# Patient Record
Sex: FEMALE | Race: BLACK OR AFRICAN AMERICAN | NOT HISPANIC OR LATINO | Employment: FULL TIME | URBAN - METROPOLITAN AREA
[De-identification: names, ages, dates, MRNs, and addresses within clinical notes are randomized per-mention and may not be internally consistent; named-entity substitution may affect disease eponyms.]

---

## 2019-12-25 ENCOUNTER — OFFICE VISIT (OUTPATIENT)
Dept: URGENT CARE | Facility: URGENT CARE | Age: 54
End: 2019-12-25
Payer: COMMERCIAL

## 2019-12-25 VITALS
TEMPERATURE: 98 F | OXYGEN SATURATION: 97 % | DIASTOLIC BLOOD PRESSURE: 89 MMHG | SYSTOLIC BLOOD PRESSURE: 152 MMHG | WEIGHT: 145 LBS | HEART RATE: 60 BPM

## 2019-12-25 DIAGNOSIS — M25.511 ACUTE PAIN OF RIGHT SHOULDER: Primary | ICD-10-CM

## 2019-12-25 PROCEDURE — 99203 OFFICE O/P NEW LOW 30 MIN: CPT | Performed by: PHYSICIAN ASSISTANT

## 2019-12-25 RX ORDER — ACETAMINOPHEN 500 MG
500-1000 TABLET ORAL EVERY 6 HOURS PRN
Qty: 120 TABLET | Refills: 0 | Status: SHIPPED | OUTPATIENT
Start: 2019-12-25 | End: 2020-01-24

## 2019-12-25 RX ORDER — IBUPROFEN 600 MG/1
600 TABLET, FILM COATED ORAL EVERY 6 HOURS PRN
Qty: 120 TABLET | Refills: 0 | Status: SHIPPED | OUTPATIENT
Start: 2019-12-25 | End: 2020-01-24

## 2019-12-25 NOTE — PATIENT INSTRUCTIONS
Patient Education     Shoulder Pain with Uncertain Cause  Shoulder pain can have many causes. Pain often comes from the structures that surround the shoulder joint. These are the joint capsule, ligaments, tendons, muscles, and bursa. Pain can also come from cartilage in the joint. Cartilage can become worn out or injured. It s important to know what s causing your pain so the healthcare provider can use the correct treatment. But sometimes it s difficult to find the exact cause of shoulder pain. You may need to see a specialist (orthopedist). You may also need special tests such as a CT scan or MRI. The provider may need to use special tools to look inside the joint (arthroscopy).  Shoulder pain can be treated with a sling or a device that keeps your shoulder from moving. You can take an anti-inflammatory medicine such as ibuprofen to ease pain. You may need to do special shoulder exercises. Follow up with a specialist if the pain is severe or doesn t go away after a few weeks.  Home care  Follow these tips when caring for yourself at home:    If a sling was given to you, leave it in place for the time advised by your healthcare provider. If you aren t sure how long to wear it, ask for advice. If the sling becomes loose, adjust it so that your forearm is level with the ground. Your shoulder should feel well supported.    Put an ice pack on the injured area for 20 minutes every 1 to 2 hours the first day. You can make your own ice pack by putting ice cubes in a plastic bag. Wrap the bag in a thin towel. Continue with ice packs 3 to 4 times a day for the next 2 days. Then use the pack as needed to ease pain and swelling.    You may use acetaminophen or ibuprofen to control pain, unless another pain medicine was prescribed. If you have chronic liver or kidney disease, talk with your healthcare provider before using these medicines. Also talk with your provider if you ve ever had a stomach ulcer or GI  bleeding.    Shoulder pain may seem worse at night, when there is less to distract you from the pain. If you sleep on your side, try to keep weight off your painful shoulder. Propping pillows behind you may stop you from rolling over onto that shoulder during sleep.     Shoulder and elbow joints can become stiff if left in a sling for too long. You should start range of motion exercises about 7 to 10 days after the injury. Talk with your provider to find out what type of exercises to do and how soon to start.    You can take the sling off to shower or bathe.  Follow-up care  Follow up with your healthcare provider if you don t start to get better in the next 5 days.  When to seek medical advice  Call your healthcare provider right away if any of these occur:    Pain or swelling gets worse or continues for more than a few days    Your hand or fingers become cold, blue, numb, or tingly    Large amount of bruising on your shoulder or upper arm    Difficulty moving your hand or fingers    Weakness in your hand or fingers    Your shoulder becomes stiff    It feels like your shoulder is popping out    You are less able to do your daily activities  Date Last Reviewed: 10/1/2016    7284-3593 [employer]. [address] [place] [address], [place] [address]. All rights reserved. This information is not intended as a substitute for professional medical care. Always follow your healthcare professional's instructions.

## 2019-12-25 NOTE — PROGRESS NOTES
SUBJECTIVE:  Chief Complaint   Patient presents with     ARM PAIN     Patient has had right shoulder pain for more than a month without any known injury. Patient states that she had an X-ray of the shoulder several weeks ago with [facility] that showed no fracture of the shoulder.     Patient has been [place]esinos her arm in a sling which was given to her during her visit.     Nolan Edwards is a 54 year old female presents with a chief complaint of right shoulder pain.  The injury occurred 1 month(s) ago.    Pain exacerbated by Abduction and flexion of shoulder..  Relieved by rest and ice.      No past medical history on file.  Current Outpatient Medications   Medication Sig Dispense Refill     acetaminophen (TYLENOL) 500 MG tablet Take 1-2 tablets (500-1,000 mg) by mouth every 6 hours as needed for pain 120 tablet 0     ibuprofen (ADVIL/MOTRIN) 600 MG tablet Take 1 tablet (600 mg) by mouth every 6 hours as needed for moderate pain 120 tablet 0     Social History     Tobacco Use     Smoking status: Not on file   Substance Use Topics     Alcohol use: Not on file       ROS:  CONSTITUTIONAL:NEGATIVE for fever, chills, change in weight  INTEGUMENTARY/SKIN: NEGATIVE for worrisome rashes, moles or lesions  RESP:NEGATIVE for significant cough or SOB  CV: NEGATIVE for chest pain, palpitations or peripheral edema  MUSCULOSKELETAL: Patient does not have limits in ROM, however she is slow to abduct or flex the shoulder due to increased pain.   NEURO: NEGATIVE for weakness, dizziness or paresthesias  PSYCHIATRIC: NEGATIVE for changes in mood or affect    EXAM:   BP (!) 152/89   Pulse 60   Temp 98  F (36.7  C)   Wt 65.8 kg (145 lb)   SpO2 97%   Gen: healthy, alert, active and mild distress  Extremity:right shoulder has point tenderness over the superior portion of the deltoid.   There is not compromise to the distal circulation.  Pulses are +2 and CRT is brisk  GENERAL APPEARANCE: healthy, alert and no  distress  EXTREMITIES: peripheral pulses normal  SKIN: no suspicious lesions or rashes  NEURO: Normal strength and tone, sensory exam grossly normal, mentation intact and speech normal.   Neers test on the right was positive. Mack was positive as well.     X-RAY was not done.    ASSESSMENT/PLAN:     (M27.892) Acute pain of right shoulder  (primary encounter diagnosis)  Comment: The etiology of the patient's pain is puzzling. I suspect arthritis or tendonitis.   Plan: ibuprofen (ADVIL/MOTRIN) 600 MG tablet,         acetaminophen (TYLENOL) 500 MG tablet    Rest the affected area as much as possible.  Apply ice for 15-20 minutes intermittently as needed and especially after any offending activity. Hot packs are better for muscle spasms and cramping. Daily stretching as tolerated.  As pain recedes, begin normal activities slowly as tolerated.  Consider Physical Therapy after 6 weeks if symptoms not better with conservative care.      Okay to take acetaminophen 500 mg- 2 tabs (Total of 1000 mg) every 8 hrs   Okay to take ibuprofen 200 mg- 3 tabs (Total of 600 mg) every 6 hours        If pain persists beyond 1 month, patient will need to establish with a PCP and consider MRI of her shoulder to rule out rotator cuff tear.      Patient was advised to return to clinic if symptoms do not improve in the amount of time specified in the AVS or if symptoms worsen. Patient educated on red flag symptoms and asked to go directly to the ED if symptoms present themselves.       Santiago Peña PA-C on 12/25/2019 at 4:34 PM

## 2021-11-11 ENCOUNTER — APPOINTMENT (OUTPATIENT)
Dept: ULTRASOUND IMAGING | Facility: CLINIC | Age: 56
End: 2021-11-11
Attending: EMERGENCY MEDICINE
Payer: COMMERCIAL

## 2021-11-11 ENCOUNTER — OFFICE VISIT (OUTPATIENT)
Dept: URGENT CARE | Facility: URGENT CARE | Age: 56
End: 2021-11-11
Payer: COMMERCIAL

## 2021-11-11 ENCOUNTER — HOSPITAL ENCOUNTER (EMERGENCY)
Facility: CLINIC | Age: 56
Discharge: HOME | End: 2021-11-11
Attending: EMERGENCY MEDICINE | Admitting: EMERGENCY MEDICINE
Payer: COMMERCIAL

## 2021-11-11 ENCOUNTER — APPOINTMENT (OUTPATIENT)
Dept: CT IMAGING | Facility: CLINIC | Age: 56
End: 2021-11-11
Attending: EMERGENCY MEDICINE
Payer: COMMERCIAL

## 2021-11-11 VITALS
HEART RATE: 79 BPM | DIASTOLIC BLOOD PRESSURE: 79 MMHG | OXYGEN SATURATION: 100 % | TEMPERATURE: 97.1 F | WEIGHT: 148.15 LBS | RESPIRATION RATE: 18 BRPM | SYSTOLIC BLOOD PRESSURE: 149 MMHG

## 2021-11-11 VITALS
TEMPERATURE: 97.7 F | DIASTOLIC BLOOD PRESSURE: 88 MMHG | HEART RATE: 74 BPM | OXYGEN SATURATION: 98 % | SYSTOLIC BLOOD PRESSURE: 140 MMHG | RESPIRATION RATE: 12 BRPM | WEIGHT: 151 LBS

## 2021-11-11 DIAGNOSIS — R10.31 RLQ ABDOMINAL PAIN: Primary | ICD-10-CM

## 2021-11-11 DIAGNOSIS — N94.89 ADNEXAL MASS: ICD-10-CM

## 2021-11-11 LAB
ALBUMIN SERPL-MCNC: 3.4 G/DL (ref 3.4–5)
ALBUMIN UR-MCNC: NEGATIVE MG/DL
ALP SERPL-CCNC: 70 U/L (ref 40–150)
ALT SERPL W P-5'-P-CCNC: 25 U/L (ref 0–50)
ANION GAP SERPL CALCULATED.3IONS-SCNC: 4 MMOL/L (ref 3–14)
APPEARANCE UR: CLEAR
AST SERPL W P-5'-P-CCNC: 31 U/L (ref 0–45)
BASOPHILS # BLD AUTO: 0 10E3/UL (ref 0–0.2)
BASOPHILS NFR BLD AUTO: 0 %
BILIRUB SERPL-MCNC: 0.3 MG/DL (ref 0.2–1.3)
BILIRUB UR QL STRIP: NEGATIVE
BUN SERPL-MCNC: 19 MG/DL (ref 7–30)
CALCIUM SERPL-MCNC: 8.8 MG/DL (ref 8.5–10.1)
CHLORIDE BLD-SCNC: 109 MMOL/L (ref 94–109)
CO2 SERPL-SCNC: 27 MMOL/L (ref 20–32)
COLOR UR AUTO: ABNORMAL
CREAT SERPL-MCNC: 0.55 MG/DL (ref 0.52–1.04)
EOSINOPHIL # BLD AUTO: 0.2 10E3/UL (ref 0–0.7)
EOSINOPHIL NFR BLD AUTO: 2 %
ERYTHROCYTE [DISTWIDTH] IN BLOOD BY AUTOMATED COUNT: 12.4 % (ref 10–15)
GFR SERPL CREATININE-BSD FRML MDRD: >90 ML/MIN/1.73M2
GLUCOSE BLD-MCNC: 102 MG/DL (ref 70–99)
GLUCOSE UR STRIP-MCNC: NEGATIVE MG/DL
HCT VFR BLD AUTO: 41.8 % (ref 35–47)
HGB BLD-MCNC: 13.7 G/DL (ref 11.7–15.7)
HGB UR QL STRIP: NEGATIVE
HOLD SPECIMEN: NORMAL
IMM GRANULOCYTES # BLD: 0 10E3/UL
IMM GRANULOCYTES NFR BLD: 0 %
KETONES UR STRIP-MCNC: NEGATIVE MG/DL
LEUKOCYTE ESTERASE UR QL STRIP: ABNORMAL
LIPASE SERPL-CCNC: 138 U/L (ref 73–393)
LYMPHOCYTES # BLD AUTO: 3.8 10E3/UL (ref 0.8–5.3)
LYMPHOCYTES NFR BLD AUTO: 48 %
MCH RBC QN AUTO: 30.2 PG (ref 26.5–33)
MCHC RBC AUTO-ENTMCNC: 32.8 G/DL (ref 31.5–36.5)
MCV RBC AUTO: 92 FL (ref 78–100)
MONOCYTES # BLD AUTO: 0.5 10E3/UL (ref 0–1.3)
MONOCYTES NFR BLD AUTO: 7 %
NEUTROPHILS # BLD AUTO: 3.4 10E3/UL (ref 1.6–8.3)
NEUTROPHILS NFR BLD AUTO: 43 %
NITRATE UR QL: NEGATIVE
NRBC # BLD AUTO: 0 10E3/UL
NRBC BLD AUTO-RTO: 0 /100
PH UR STRIP: 7 [PH] (ref 5–7)
PLATELET # BLD AUTO: 320 10E3/UL (ref 150–450)
POTASSIUM BLD-SCNC: 4.1 MMOL/L (ref 3.4–5.3)
PROT SERPL-MCNC: 7.6 G/DL (ref 6.8–8.8)
RBC # BLD AUTO: 4.53 10E6/UL (ref 3.8–5.2)
RBC URINE: <1 /HPF
SODIUM SERPL-SCNC: 140 MMOL/L (ref 133–144)
SP GR UR STRIP: 1.02 (ref 1–1.03)
UROBILINOGEN UR STRIP-MCNC: NORMAL MG/DL
WBC # BLD AUTO: 7.9 10E3/UL (ref 4–11)
WBC URINE: 3 /HPF

## 2021-11-11 PROCEDURE — 85025 COMPLETE CBC W/AUTO DIFF WBC: CPT | Performed by: EMERGENCY MEDICINE

## 2021-11-11 PROCEDURE — 250N000009 HC RX 250: Performed by: EMERGENCY MEDICINE

## 2021-11-11 PROCEDURE — 36415 COLL VENOUS BLD VENIPUNCTURE: CPT | Performed by: EMERGENCY MEDICINE

## 2021-11-11 PROCEDURE — 82378 CARCINOEMBRYONIC ANTIGEN: CPT | Performed by: EMERGENCY MEDICINE

## 2021-11-11 PROCEDURE — 96374 THER/PROPH/DIAG INJ IV PUSH: CPT | Mod: 59

## 2021-11-11 PROCEDURE — 250N000011 HC RX IP 250 OP 636: Performed by: EMERGENCY MEDICINE

## 2021-11-11 PROCEDURE — 86304 IMMUNOASSAY TUMOR CA 125: CPT | Performed by: EMERGENCY MEDICINE

## 2021-11-11 PROCEDURE — 76830 TRANSVAGINAL US NON-OB: CPT

## 2021-11-11 PROCEDURE — 99285 EMERGENCY DEPT VISIT HI MDM: CPT | Mod: 25

## 2021-11-11 PROCEDURE — 81001 URINALYSIS AUTO W/SCOPE: CPT | Performed by: EMERGENCY MEDICINE

## 2021-11-11 PROCEDURE — 74177 CT ABD & PELVIS W/CONTRAST: CPT

## 2021-11-11 PROCEDURE — 93976 VASCULAR STUDY: CPT | Mod: XS

## 2021-11-11 PROCEDURE — 83690 ASSAY OF LIPASE: CPT | Performed by: EMERGENCY MEDICINE

## 2021-11-11 PROCEDURE — 82040 ASSAY OF SERUM ALBUMIN: CPT | Performed by: EMERGENCY MEDICINE

## 2021-11-11 PROCEDURE — 99215 OFFICE O/P EST HI 40 MIN: CPT | Performed by: PHYSICIAN ASSISTANT

## 2021-11-11 RX ORDER — LEVOTHYROXINE SODIUM 75 UG/1
75 TABLET ORAL DAILY
COMMUNITY
Start: 2021-09-11

## 2021-11-11 RX ORDER — IOPAMIDOL 755 MG/ML
500 INJECTION, SOLUTION INTRAVASCULAR ONCE
Status: COMPLETED | OUTPATIENT
Start: 2021-11-11 | End: 2021-11-11

## 2021-11-11 RX ORDER — LORAZEPAM 0.5 MG
2000 TABLET ORAL
COMMUNITY
Start: 2019-08-23

## 2021-11-11 RX ORDER — KETOROLAC TROMETHAMINE 15 MG/ML
15 INJECTION, SOLUTION INTRAMUSCULAR; INTRAVENOUS ONCE
Status: COMPLETED | OUTPATIENT
Start: 2021-11-11 | End: 2021-11-11

## 2021-11-11 RX ADMIN — KETOROLAC TROMETHAMINE 15 MG: 15 INJECTION, SOLUTION INTRAMUSCULAR; INTRAVENOUS at 20:53

## 2021-11-11 RX ADMIN — IOPAMIDOL 74 ML: 755 INJECTION, SOLUTION INTRAVENOUS at 19:39

## 2021-11-11 RX ADMIN — SODIUM CHLORIDE 59 ML: 9 INJECTION, SOLUTION INTRAVENOUS at 19:39

## 2021-11-11 NOTE — PROGRESS NOTES
Assessment/Plan:    Pt has RLQ pain including tenderness at McBurney's point, as well as anorexia. She has no RBT or rigidity, is afebrile. I am concerned about appendicitis and have advised pt go to the ER at [facility] to have this ruled out. She will go by private vehicle.   See patient instructions below.    At the end of the encounter, I discussed results, diagnosis, medications. Discussed red flags for immediate return to clinic/ER, as well as indications for follow up if no improvement. Patient understood and agreed to plan. Patient was stable for discharge.      ICD-10-CM    1. RLQ abdominal pain  R10.31          Return in about 1 week (around 2021) for Follow up w/ primary care provider after ER visit.    SORAYA Hayden, ROBERTO  [facility]  -----------------------------------------------------------------------------------------------------------------------------------------------------    HPI:  Nolan Edwards is a 56 year old female who presents for evaluation of RLQ abdominal pain & decreased appetite onset 0300 this AM. Pain woke her from her sleep and has been constant since then. Pain radiates into R lower back. Ibuprofen helps somewhat. Patient reports no fever/chills, headache, chest pain, shortness of breath, nausea, vomiting, constipation, diarrhea, urinary symptoms, rash, or any other symptoms.     She has hx of two  sections and fibroid removal, no other abdominal surgeries.    No past medical history on file.    Vitals:    21 1713   BP: (!) 140/88   Pulse: 74   Resp: 12   Temp: 97.7  F (36.5  C)   TempSrc: Tympanic   SpO2: 98%   Weight: 68.5 kg (151 lb)       Physical Exam  Vitals and nursing note reviewed.   Pulmonary:      Effort: Pulmonary effort is normal.   Abdominal:      General: Abdomen is flat. Bowel sounds are normal.      Palpations: Abdomen is soft.      Tenderness: There is abdominal tenderness in the right  lower quadrant. There is no right CVA tenderness or left CVA tenderness. Positive signs include McBurney's sign.   Musculoskeletal:      Lumbar back: Tenderness present. No bony tenderness.        Back:    Neurological:      Mental Status: She is alert.         Labs/Imaging:  No results found for this or any previous visit (from the past 24 hour(s)).      Patient Instructions   Go to the ER at [facility] for further evaluation- to rule out appendicitis.

## 2021-11-11 NOTE — NURSING NOTE
Chief Complaint   Patient presents with     Urgent Care     Abdominal Pain     She began having right sided mid  abdominal pain since about 3:00 a.m. today.  No fever today. No appetite today.  She has not vomited.  Shehad a normal BM this morning.  She say the pain radiates around to her right side and into back.  She says she isnt sure but maybe radiating pain into her right leg at times as well.     Initial BP (!) 140/88   Pulse 74   Temp 97.7  F (36.5  C) (Tympanic)   Resp 12   Wt 68.5 kg (151 lb)   SpO2 98%  There is no height or weight on file to calculate BMI..  BP completed using cuff size: regular  Joseline Person R.N.

## 2021-11-12 LAB
CANCER AG125 SERPL-ACNC: <5 U/ML (ref 0–30)
CEA SERPL-MCNC: 0.8 UG/L (ref 0–2.5)

## 2021-11-12 ASSESSMENT — ENCOUNTER SYMPTOMS
COUGH: 0
NAUSEA: 0
ABDOMINAL PAIN: 1
DYSURIA: 0
VOMITING: 0
FLANK PAIN: 0
CHILLS: 0
FEVER: 0
SHORTNESS OF BREATH: 0

## 2021-11-12 NOTE — PROGRESS NOTES
RECORDS STATUS - ALL OTHER DIAGNOSIS      RECORDS RECEIVED FROM: [facility]   DATE RECEIVED: 11/16/2021   NOTES STATUS DETAILS   OFFICE NOTE from referring provider     OFFICE NOTE from medical oncologist N/A    DISCHARGE SUMMARY from hospital N/A    DISCHARGE REPORT from the ER Complete [facility] 11/11/2021 Adnexal Mass    OPERATIVE REPORT     MEDICATION LIST [facility]   CLINICAL TRIAL TREATMENTS TO DATE     LABS     PATHOLOGY REPORTS     ANYTHING RELATED TO DIAGNOSIS Complete Labs Last updated 11/11/2021   GENONOMIC TESTING     TYPE:     IMAGING (NEED IMAGES & REPORT)     CT SCANS Complete CT Abdomen Pelvis 11/11/2021   MRI     MAMMO     ULTRASOUND Complete US Pelvis Complete 11/11/2021   PET

## 2021-11-12 NOTE — ED TRIAGE NOTES
Pt comes in for rt side abd pain that started at 3 am. Pt sent over from urgent care to rule out appendicitis. No testing done prior to arrival. Pt tried Ibuprofen with little relief. Pt is nauseated, no vomiting.

## 2021-11-12 NOTE — DISCHARGE INSTRUCTIONS
Dr. Whitfield's office should be calling tomorrow to schedule follow-up appointment. If you don't hear from her office, ok to call tomorrow afternoon to try to schedule.

## 2021-11-12 NOTE — ED PROVIDER NOTES
History     Chief Complaint:  Abdominal Pain    HPI   Nolan Edwards is a 56 year old female who presents with chief complaint abdominal pain.  Patient reports pain is localized to the right side of her lower abdomen with onset about 16 hours ago.  She denies similar symptoms in the past.  She reports pain is lower in her abdomen and feels like it is next to her bladder.  She denies nausea or vomiting.  Denies fevers or chills.  She tried ibuprofen with minimal relief.  She presented to urgent care first and was subsequently sent to emergency department for further evaluation and treatment.  She reports she has had prior abdominal surgery for fibroid removal back in 2009.  She reports that her last menses was around then.  She denies any vaginal bleeding.    Review of Systems   Constitutional: Negative for chills and fever.   Respiratory: Negative for cough and shortness of breath.    Cardiovascular: Negative for chest pain.   Gastrointestinal: Positive for abdominal pain. Negative for nausea and vomiting.   Genitourinary: Positive for pelvic pain. Negative for dysuria, flank pain, menstrual problem and vaginal bleeding.   All other systems reviewed and are negative.        Allergies:  Ceftriaxone  Cephalexin  Ukbvihus-Szbwmpm-Gtmwmw [Fluocinolone]  Quinolones  Salicylates      Medications:    Levothyroxine     Past Medical History:    Uterine fibroids   Hypothyroidism    Past Surgical History:    Uterine fibroid removal  Shoulder surgery  Tubal ligation    Family History:    No known history of ovarian or breast cancer.    Social History:  Here with her     Physical Exam     Patient Vitals for the past 24 hrs:   BP Temp Temp src Pulse Resp SpO2 Weight   11/11/21 1915 (!) 150/82 -- -- -- -- 97 % --   11/11/21 1901 (!) 159/97 -- -- 74 -- -- --   11/11/21 1833 (!) 152/103 97.1  F (36.2  C) Temporal 84 18 -- 67.2 kg (148 lb 2.4 oz)       Physical Exam  Nursing note and vitals reviewed.    HENT:    Mouth/Throat: Moist mucous membranes.   Eyes: EOMI, nonicteric sclera  Cardiovascular: Normal rate, regular rhythm, no murmurs, rubs, or gallops  Pulmonary/Chest: Effort normal and breath sounds normal. No respiratory distress. No wheezes. No rales.   Abdominal: Soft.  Mild right lower quadrant tenderness to palpation without guarding or rigidity, nondistended  Musculoskeletal: Normal range of motion.   Neurological: Alert. Moves all extremities spontaneously.   Skin: Skin is warm and dry. No rash noted.   Psychiatric: Normal mood and affect.       Emergency Department Course       Imaging:  US Pelvis Cmplt w Transvag & Doppler LmtPel Duplex Limited   Final Result   IMPRESSION:     1.  Left adnexal mass as noted on CT with differential including both malignant and benign etiologies. Gynecologic surgery consult recommended to consider oophorectomy.      NOTE: ABNORMAL REPORT      THE DICTATION ABOVE DESCRIBES AN ABNORMALITY FOR WHICH FOLLOW-UP IS NEEDED.          CYSTS WITH WORRISOME CHARACTERISTICS:   (thick septations >3mm, solid elements, wall thickening >=3 mm, or nodules)   Any age: surgical evaluation recommended      REFERENCE:   Management of Asymptomatic Ovarian and Other Adnexal Cysts Imaged at US: Society of Radiologists in Ultrasound Consensus Conference Statement. Radiology September 2010; [phone].      Simple Adnexal Cysts: SRU Consensus Conference Update on Follow-up and Reporting. Radiology September 2019. [phone].      Abd/pelvis CT,  IV  contrast only TRAUMA / AAA   Final Result   IMPRESSION:    1.  7.5 x 7.2 cm left adnexal mass indeterminate for malignancy. Gynecologic surgery consult recommended to consider oophorectomy.   2.  Diverticulosis without diverticulitis.      NOTE: ABNORMAL REPORT      THE DICTATION ABOVE DESCRIBES AN ABNORMALITY FOR WHICH FOLLOW-UP IS NEEDED.           Laboratory:  Labs Ordered and Resulted from Time of ED Arrival to Time of ED Departure   COMPREHENSIVE  METABOLIC PANEL - Abnormal       Result Value    Sodium 140      Potassium 4.1      Chloride 109      Carbon Dioxide (CO2) 27      Anion Gap 4      Urea Nitrogen 19      Creatinine 0.55      Calcium 8.8      Glucose 102 (*)     Alkaline Phosphatase 70      AST 31      ALT 25      Protein Total 7.6      Albumin 3.4      Bilirubin Total 0.3      GFR Estimate >90     ROUTINE UA WITH MICROSCOPIC REFLEX TO CULTURE - Abnormal    Color Urine Straw      Appearance Urine Clear      Glucose Urine Negative      Bilirubin Urine Negative      Ketones Urine Negative      Specific Gravity Urine 1.024      Blood Urine Negative      pH Urine 7.0      Protein Albumin Urine Negative      Urobilinogen Urine Normal      Nitrite Urine Negative      Leukocyte Esterase Urine Trace (*)     RBC Urine <1      WBC Urine 3     LIPASE - Normal    Lipase 138     CBC WITH PLATELETS AND DIFFERENTIAL    WBC Count 7.9      RBC Count 4.53      Hemoglobin 13.7      Hematocrit 41.8      MCV 92      MCH 30.2      MCHC 32.8      RDW 12.4      Platelet Count 320      % Neutrophils 43      % Lymphocytes 48      % Monocytes 7      % Eosinophils 2      % Basophils 0      % Immature Granulocytes 0      NRBCs per 100 WBC 0      Absolute Neutrophils 3.4      Absolute Lymphocytes 3.8      Absolute Monocytes 0.5      Absolute Eosinophils 0.2      Absolute Basophils 0.0      Absolute Immature Granulocytes 0.0      Absolute NRBCs 0.0             Emergency Department Course:    Reviewed:  I reviewed nursing notes, vitals, past history and care everywhere    Consults:   Gyn/onc    Interventions:  Medications   iopamidol (ISOVUE-370) solution 500 mL (74 mLs Intravenous Given 11/11/21 1939)   sodium chloride 0.9 % bag 100mL for CT scan flush use (59 mLs Intravenous Given 11/11/21 1939)   ketorolac (TORADOL) injection 15 mg (15 mg Intravenous Given 11/11/21 2053)       Disposition:  The patient was discharged to home.    Impression & Plan         Medical Decision  Making:  Patient presents with right lower quadrant abdominal pain.  Broad differential diagnosis considered.  Given the concern for potentially emergent intra-abdominal process, CT of the abdomen and pelvis was obtained.  This did not suggest appendicitis, but a left-sided ovarian mass was visualized.  I reviewed this on imaging and mass is pretty close to midline and does cross midline therefore in the absence of other findings, I do believe that this represents the nature of patient's pain.  Given the concern for malignancy as well as possible torsion given the size, a pelvic ultrasound was also obtained which redemonstrates the mass, but blood flow was noted.  Certainly, this does not rule out torsion, but patient likely needs oophorectomy regardless.  Discussed with on-call gynecologic surgery who agree with discharge home and close outpatient follow-up with Dr. Whitfield.  They will facilitate scheduling follow-up for patient.  Patient and  were fully informed as to possibility of both benign and malignant causes.  Gynecologic surgery did ask me to add on tumor markers that they will discuss the results with patient in clinic.  Patient and  are in agreement with plan.  Patient safe for discharge home.  All questions answered.  Red flags to merit ED return discussed.    Diagnosis:    ICD-10-CM    1. Adnexal mass  N94.89          Scribe Disclosure:  I, Maggie Marino, am serving as a scribe at 7:21 PM on 11/11/2021 to document services personally performed by Brodie Mcallister MD based on my observations and the provider's statements to me.      Brodie Mcallister MD  11/12/21 1527

## 2021-11-16 ENCOUNTER — LAB (OUTPATIENT)
Dept: ONCOLOGY | Facility: CLINIC | Age: 56
End: 2021-11-16
Attending: OBSTETRICS & GYNECOLOGY
Payer: COMMERCIAL

## 2021-11-16 ENCOUNTER — PRE VISIT (OUTPATIENT)
Dept: ONCOLOGY | Facility: CLINIC | Age: 56
End: 2021-11-16

## 2021-11-16 VITALS
HEART RATE: 85 BPM | WEIGHT: 149 LBS | SYSTOLIC BLOOD PRESSURE: 163 MMHG | DIASTOLIC BLOOD PRESSURE: 88 MMHG | HEIGHT: 63 IN | OXYGEN SATURATION: 98 % | RESPIRATION RATE: 16 BRPM | BODY MASS INDEX: 26.4 KG/M2 | TEMPERATURE: 97.7 F

## 2021-11-16 DIAGNOSIS — R19.00 PELVIC MASS: Primary | ICD-10-CM

## 2021-11-16 DIAGNOSIS — R19.00 PELVIC MASS: ICD-10-CM

## 2021-11-16 LAB
ABO/RH(D): NORMAL
ALBUMIN SERPL-MCNC: 3.7 G/DL (ref 3.4–5)
ALP SERPL-CCNC: 72 U/L (ref 40–150)
ALT SERPL W P-5'-P-CCNC: 23 U/L (ref 0–50)
ANION GAP SERPL CALCULATED.3IONS-SCNC: 4 MMOL/L (ref 3–14)
ANTIBODY SCREEN: NEGATIVE
AST SERPL W P-5'-P-CCNC: 22 U/L (ref 0–45)
BASOPHILS # BLD AUTO: 0 10E3/UL (ref 0–0.2)
BASOPHILS NFR BLD AUTO: 0 %
BILIRUB SERPL-MCNC: 0.2 MG/DL (ref 0.2–1.3)
BUN SERPL-MCNC: 14 MG/DL (ref 7–30)
CALCIUM SERPL-MCNC: 9.2 MG/DL (ref 8.5–10.1)
CHLORIDE BLD-SCNC: 107 MMOL/L (ref 94–109)
CO2 SERPL-SCNC: 28 MMOL/L (ref 20–32)
CREAT SERPL-MCNC: 0.58 MG/DL (ref 0.52–1.04)
EOSINOPHIL # BLD AUTO: 0.1 10E3/UL (ref 0–0.7)
EOSINOPHIL NFR BLD AUTO: 1 %
ERYTHROCYTE [DISTWIDTH] IN BLOOD BY AUTOMATED COUNT: 12.2 % (ref 10–15)
GFR SERPL CREATININE-BSD FRML MDRD: >90 ML/MIN/1.73M2
GLUCOSE BLD-MCNC: 95 MG/DL (ref 70–99)
HCT VFR BLD AUTO: 41.7 % (ref 35–47)
HGB BLD-MCNC: 13.6 G/DL (ref 11.7–15.7)
IMM GRANULOCYTES # BLD: 0 10E3/UL
IMM GRANULOCYTES NFR BLD: 0 %
LYMPHOCYTES # BLD AUTO: 2.5 10E3/UL (ref 0.8–5.3)
LYMPHOCYTES NFR BLD AUTO: 36 %
MCH RBC QN AUTO: 30 PG (ref 26.5–33)
MCHC RBC AUTO-ENTMCNC: 32.6 G/DL (ref 31.5–36.5)
MCV RBC AUTO: 92 FL (ref 78–100)
MONOCYTES # BLD AUTO: 0.5 10E3/UL (ref 0–1.3)
MONOCYTES NFR BLD AUTO: 7 %
NEUTROPHILS # BLD AUTO: 3.8 10E3/UL (ref 1.6–8.3)
NEUTROPHILS NFR BLD AUTO: 56 %
NRBC # BLD AUTO: 0 10E3/UL
NRBC BLD AUTO-RTO: 0 /100
PLATELET # BLD AUTO: 324 10E3/UL (ref 150–450)
POTASSIUM BLD-SCNC: 4 MMOL/L (ref 3.4–5.3)
PROT SERPL-MCNC: 8.1 G/DL (ref 6.8–8.8)
RBC # BLD AUTO: 4.53 10E6/UL (ref 3.8–5.2)
SODIUM SERPL-SCNC: 139 MMOL/L (ref 133–144)
SPECIMEN EXPIRATION DATE: NORMAL
WBC # BLD AUTO: 6.8 10E3/UL (ref 4–11)

## 2021-11-16 PROCEDURE — 85025 COMPLETE CBC W/AUTO DIFF WBC: CPT | Performed by: OBSTETRICS & GYNECOLOGY

## 2021-11-16 PROCEDURE — 86900 BLOOD TYPING SEROLOGIC ABO: CPT | Performed by: OBSTETRICS & GYNECOLOGY

## 2021-11-16 PROCEDURE — 99205 OFFICE O/P NEW HI 60 MIN: CPT | Performed by: OBSTETRICS & GYNECOLOGY

## 2021-11-16 PROCEDURE — 36415 COLL VENOUS BLD VENIPUNCTURE: CPT

## 2021-11-16 PROCEDURE — G0463 HOSPITAL OUTPT CLINIC VISIT: HCPCS

## 2021-11-16 PROCEDURE — 82040 ASSAY OF SERUM ALBUMIN: CPT | Performed by: OBSTETRICS & GYNECOLOGY

## 2021-11-16 RX ORDER — OMEGA-3 FATTY ACIDS/FISH OIL 300-1000MG
200 CAPSULE ORAL EVERY 4 HOURS PRN
COMMUNITY

## 2021-11-16 RX ORDER — HEPARIN SODIUM 10000 [USP'U]/ML
5000 INJECTION, SOLUTION INTRAVENOUS; SUBCUTANEOUS
Status: CANCELLED | OUTPATIENT
Start: 2021-11-16

## 2021-11-16 RX ORDER — PHENAZOPYRIDINE HYDROCHLORIDE 100 MG/1
200 TABLET, FILM COATED ORAL ONCE
Status: CANCELLED | OUTPATIENT
Start: 2021-11-16 | End: 2021-11-16

## 2021-11-16 ASSESSMENT — PAIN SCALES - GENERAL: PAINLEVEL: NO PAIN (0)

## 2021-11-16 ASSESSMENT — MIFFLIN-ST. JEOR: SCORE: 1226.73

## 2021-11-16 NOTE — PROGRESS NOTES
Medical Assistant Note:  Nolan Jerry presents today for blood draw.    Patient seen by provider today: Yes: Dr. Whitfield.   present during visit today: Not Applicable.    Concerns: No Concerns.    Procedure:  Labs drawn    Post Assessment:  Labs drawn without difficulty: Yes.    Discharge Plan:  Departure Mode: Ambulatory.    Face to Face Time: 10 min.    Bela Donahue [facility]

## 2021-11-16 NOTE — PROGRESS NOTES
Consult Notes on Referred Patient            RE: Nolan Edwards  : 1965  VICTORIA: 2021        I had the pleasure of seeing your patient Nolan Edwards here at the Gynecologic Cancer Clinic at the [facility] on 2021.  As you know she is a very pleasant 56 year old woman with a recent diagnosis of pelvic mass.  Given these findings she was subsequently sent to the Gynecologic Cancer Clinic for new patient consultation.  She is accompanied today by her son.    Patient had been having intermittent  right lower abdominal pain, however it worsened and she thus presented to the ED with an acute worsening of right lower quadrant pain and was found to have an ovarian mass. She denies changes in her bowel/bladder habits.  No PMB    3/18/08:  1.  Abdominal myomectomy with removal of thirteen fibroids. 2.  Bilateral tubal ligation via modified [place] with JEAN MARIE GUERRERO MD     Pathology:  Benign leiomyoma    21:   <5, CEA 0.8    21:  CT A/P (Personally reviewed):  1.  7.5 x 7.2 cm left adnexal mass indeterminate for malignancy. 2.  Diverticulosis without diverticulitis.    21:  US Pelvis (personally reviewed):  UTERUS: 6.9 x 1.9 x 4.7 cm. Normal in size and position with no masses. ENDOMETRIUM: 3 mm. Not well-defined. No mass identified. RIGHT OVARY: Not visualized due to bowel gas. LEFT OVARY: 9.4 x 4.3 x 7.4 cm. The entire left adnexa is replaced with a multi-locular cystic mass with duplex Doppler flow noted within the septations.No significant free fluid.    Obstetrics and Gynecology History:  , c/s x 2      Past Medical History:  Past Medical History:   Diagnosis Date     Hypothyroidism        Past Surgical History:  Past Surgical History:   Procedure Laterality Date      SECTION       MYOMECTOMY       ROTATOR CUFF REPAIR RT/LT Right        Health Maintenance:  Last Pap Smear: 20              Result: Negative, HPV negative  She has not had a  "history of abnormal Pap smears.    Last Mammogram: 8/17/21              Result: normal      She has not had a history of abnormal mammograms.    Last Colonoscopy: 7/16/18              Result: Polyp, repeat 5 years       Social History:  Lives with her  and son, feels safe at home.  Works as a  in [employer] public schools.  Enjoys spending time outside.  Does not have an advanced directive on file and would like her  and children to be her POA.  Would like full resuscitation if reversible cause is identified, however would not like to be kept on life sustaining measures long-term.     Family History:   The patient's family history is significant for.  Family History   Problem Relation Age of Onset     Cancer No family hx of          Physical Exam:   BP (!) 163/88   Pulse 85   Temp 97.7  F (36.5  C) (Tympanic)   Resp 16   Ht 1.595 m (5' 2.8\")   Wt 67.6 kg (149 lb)   SpO2 98%   BMI 26.57 kg/m    Body mass index is 26.57 kg/m .    General Appearance: healthy and alert, no distress        Cardiovascular: regular rate and rhythm    Respiratory: lungs clear     Gastrointestinal:       abdomen soft, non-tender, non-distended, no organomegaly or masses    Genitourinary: External genitalia and urethral meatus appears normal.  Vagina is smooth without nodularity or masses.  Cervix appears normal and without lesions.  Bimanual exam reveals a mobile mass in the posterior cul-de-sac.  Recto-vaginal exam confirms these findings.    Labs:  WBC 6.8 with ANC 3.8.  Hemoglobin 13.6.  Platelets 324.  Creatinine 0.58.  Potassium 4.  Magnesium -.  Remainder of electrolytes within normal limits.  AST 22, ALT 23, alkaline phosphatase 72, total bilirubin 0.2.  Albumin 3.7.       Assessment:    Nolan Edwards is a 56 year old woman with a new diagnosis of pelvic mass.     A total of 60 minutes was spent on patient care today.      Plan:     1.)    Pelvic mass-.We reviewed the possible etiologies " including benign, malignant and borderline.  We reviewed that given her normal  and CT without evidence of metastatic disease, that a benign etiology is more likely.  We reviewed the rationale for not performing a biopsy of the ovary.  We discussed options for repeat imaging vs immediate surgical resection and given that she is symptomatic she prefers immediate surgical intervention.  We discussed the role of frozen section analysis and that further surgical procedures including staging procedures would be based on these findings.  We discussed the risks and benefits of bilateral vs unilateral oophorectomy in the setting of a benign etiology and she would like to consider this further prior to making a final decision and we will readdress the morning of surgery.  We did discuss the rationale for bilateral salpingectomy and she is in agreement with this.  Risks, benefits, indications and alternatives were discussed.  All her questions were answered and she will undergo laparoscopic removal of one ovary and both fallopian tubes, possible cancer surgery, possible open on 12/9.     2.) Genetic risk factors were assessed and the patient does not meet the qualifications for a referral unless malignancy is identified.      3.) Labs and/or tests ordered include:  CBC, CMP, T/S, COVID.     4.) Health maintenance issues addressed today include pt is up to date.    5.) Pre-op teaching was completed today.        Thank you for allowing us to participate in the care of your patient.         Sincerely,    Pau Whitfield MD  Gynecologic Oncology  [facility]       CC

## 2021-11-16 NOTE — LETTER
2021         RE: Nolan Edwards  [address]  [place] [address]        Dear Colleague,    Thank you for referring your patient, Nolan Edwards, to the [facility]. Please see a copy of my visit note below.    Consult Notes on Referred Patient            RE: Nolan Edwards  : 1965  VICTORIA: 2021        I had the pleasure of seeing your patient Nolan Edwards here at the Gynecologic Cancer Clinic at the [facility] on 2021.  As you know she is a very pleasant 56 year old woman with a recent diagnosis of pelvic mass.  Given these findings she was subsequently sent to the Gynecologic Cancer Clinic for new patient consultation.  She is accompanied today by her son.    Patient had been having intermittent  right lower abdominal pain, however it worsened and she thus presented to the ED with an acute worsening of right lower quadrant pain and was found to have an ovarian mass. She denies changes in her bowel/bladder habits.  No PMB    3/18/08:  1.  Abdominal myomectomy with removal of thirteen fibroids. 2.  Bilateral tubal ligation via modified [place] with PA TATI GUERRERO MD     Pathology:  Benign leiomyoma    21:   <5, CEA 0.8    21:  CT A/P (Personally reviewed):  1.  7.5 x 7.2 cm left adnexal mass indeterminate for malignancy. 2.  Diverticulosis without diverticulitis.    21:  US Pelvis (personally reviewed):  UTERUS: 6.9 x 1.9 x 4.7 cm. Normal in size and position with no masses. ENDOMETRIUM: 3 mm. Not well-defined. No mass identified. RIGHT OVARY: Not visualized due to bowel gas. LEFT OVARY: 9.4 x 4.3 x 7.4 cm. The entire left adnexa is replaced with a multi-locular cystic mass with duplex Doppler flow noted within the septations.No significant free fluid.    Obstetrics and Gynecology History:  , c/s x 2      Past Medical History:  Past Medical History:   Diagnosis Date     Hypothyroidism        Past Surgical  "History:  Past Surgical History:   Procedure Laterality Date      SECTION       MYOMECTOMY       ROTATOR CUFF REPAIR RT/LT Right        Health Maintenance:  Last Pap Smear: 20              Result: Negative, HPV negative  She has not had a history of abnormal Pap smears.    Last Mammogram: 21              Result: normal      She has not had a history of abnormal mammograms.    Last Colonoscopy: 17              Result: Polyp, repeat 5 years       Social History:  Lives with her  and son, feels safe at home.  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  Enjoys spending time outside.  Does not have an advanced directive on file and would like her  and children to be her POA.  Would like full resuscitation if reversible cause is identified, however would not like to be kept on life sustaining measures long-term.     Family History:   The patient's family history is significant for.  Family History   Problem Relation Age of Onset     Cancer No family hx of          Physical Exam:   BP (!) 163/88   Pulse 85   Temp 97.7  F (36.5  C) (Tympanic)   Resp 16   Ht 1.595 m (5' 2.8\")   Wt 67.6 kg (149 lb)   SpO2 98%   BMI 26.57 kg/m    Body mass index is 26.57 kg/m .    General Appearance: healthy and alert, no distress        Cardiovascular: regular rate and rhythm    Respiratory: lungs clear     Gastrointestinal:       abdomen soft, non-tender, non-distended, no organomegaly or masses    Genitourinary: External genitalia and urethral meatus appears normal.  Vagina is smooth without nodularity or masses.  Cervix appears normal and without lesions.  Bimanual exam reveals a mobile mass in the posterior cul-de-sac.  Recto-vaginal exam confirms these findings.    Labs:  WBC 6.8 with ANC 3.8.  Hemoglobin 13.6.  Platelets 324.  Creatinine 0.58.  Potassium 4.  Magnesium -.  Remainder of electrolytes within normal limits.  AST 22, ALT 23, alkaline phosphatase 72, total bilirubin " 0.2.  Albumin 3.7.       Assessment:    Nolan Edwards is a 56 year old woman with a new diagnosis of pelvic mass.     A total of 60 minutes was spent on patient care today.      Plan:     1.)    Pelvic mass-.We reviewed the possible etiologies including benign, malignant and borderline.  We reviewed that given her normal  and CT without evidence of metastatic disease, that a benign etiology is more likely.  We reviewed the rationale for not performing a biopsy of the ovary.  We discussed options for repeat imaging vs immediate surgical resection and given that she is symptomatic she prefers immediate surgical intervention.  We discussed the role of frozen section analysis and that further surgical procedures including staging procedures would be based on these findings.  We discussed the risks and benefits of bilateral vs unilateral oophorectomy in the setting of a benign etiology and she would like to consider this further prior to making a final decision and we will readdress the morning of surgery.  We did discuss the rationale for bilateral salpingectomy and she is in agreement with this.  Risks, benefits, indications and alternatives were discussed.  All her questions were answered and she will undergo laparoscopic removal of one ovary and both fallopian tubes, possible cancer surgery, possible open on 12/9.     2.) Genetic risk factors were assessed and the patient does not meet the qualifications for a referral unless malignancy is identified.      3.) Labs and/or tests ordered include:  CBC, CMP, T/S, COVID.     4.) Health maintenance issues addressed today include pt is up to date.    5.) Pre-op teaching was completed today.        Thank you for allowing us to participate in the care of your patient.         Sincerely,    Pau Whitfield MD  Gynecologic Oncology  [facility]       CC             Again, thank you for allowing me to participate in the care of your patient.         Sincerely,        Kevin Whitfield MD

## 2021-11-16 NOTE — NURSING NOTE
"Oncology Rooming Note    November 16, 2021 1:11 PM   Nolan Edwards is a 56 year old female who presents for:    Chief Complaint   Patient presents with     Oncology Clinic Visit     New Patient     Initial Vitals: BP (!) 163/88   Pulse 85   Temp 97.7  F (36.5  C) (Tympanic)   Resp 16   Ht 1.595 m (5' 2.8\")   Wt 67.6 kg (149 lb)   SpO2 98%   BMI 26.57 kg/m   Estimated body mass index is 26.57 kg/m  as calculated from the following:    Height as of this encounter: 1.595 m (5' 2.8\").    Weight as of this encounter: 67.6 kg (149 lb). Body surface area is 1.73 meters squared.  No Pain (0) Comment: Data Unavailable   No LMP recorded.  Allergies reviewed: Yes  Medications reviewed: Yes    Medications: Medication refills not needed today.  Pharmacy name entered into [employer]:    [id number] - [place], [place] - [address] [facility] [address] [facility] [id number] [facility] [address]    Clinical concerns: New Patient       Wendy Cantrell CMA              "

## 2021-11-17 ENCOUNTER — TELEPHONE (OUTPATIENT)
Dept: ONCOLOGY | Facility: CLINIC | Age: 56
End: 2021-11-17
Payer: COMMERCIAL

## 2021-11-17 DIAGNOSIS — Z11.59 ENCOUNTER FOR SCREENING FOR OTHER VIRAL DISEASES: ICD-10-CM

## 2021-11-17 NOTE — TELEPHONE ENCOUNTER
Patient deciding between surgery on 12/9 and 12/15 with Dr. Whitfield at [facility].    She is aware that she is currently scheduled 12/9.    Scheduled tentative COVID test on 12/7 and 12/13 at [facility] to hold slots.    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.    She needs to discuss with HR about time off. I let her know that she does not NEED to take work off after pre-procedure COVID, but we want patients to isolate as much as possible.     She will call me by tomorrow evening with final decision. Provided direct number.

## 2021-11-17 NOTE — PATIENT INSTRUCTIONS
You have been scheduled for surgery on: 12/9    Diagnosis:  Pelvic mass    The surgical procedure is: laparoscopic removal of one ovary and both fallopian tubes, possible cancer surgery, possible open    Anticipated length of surgery: 1-2 hrs.  You will be in the recovery room for approximately 2-3 hrs after surgery.  Your family will not be able to see you until after you leave the recovery room.    Length of hospital stay:  This is an outpatient, extended stay surgery.  You will be discharged same day if you meet criteria for discharge.  If you have a larger surgical incision, you will need to be in the hospital 2-3 days.  ______________________________________________________________________    Preparation for Surgery:    To Schedule   1.  Labs:  CBC, CMP, T/S, orders placed, please perform today   2.  Post-operative exam with me 1-2 weeks following surgery      Postoperative Restrictions:  No heavy lifting >20lbs for six weeks, nothing in the vagina (no tampons, no intercourse, no douching) for eight weeks.     Postoperative visit:  Return to clinic 1-2 weeks after surgery for post operative visit.  Post op scheduled with Dr Whitfield on 12/28/2021 at 11:30 am  Do Diallo RN, BSN, OCN        Please contact the clinic with any questions or concerns.    Pau Whitfield MD  Gynecologic Oncology  [facility]

## 2021-11-19 ENCOUNTER — TELEPHONE (OUTPATIENT)
Dept: ONCOLOGY | Facility: CLINIC | Age: 56
End: 2021-11-19
Payer: COMMERCIAL

## 2021-11-19 NOTE — TELEPHONE ENCOUNTER
----- Message from Pau Brown MD sent at 11/19/2021  3:29 PM CST -----  Regarding: RE: 12/9 or 12/15 [facility]  Ok, If you can ask.    Otherwise We can keep her as is on 12/9  ----- Message -----  From: Ruma Alcala  Sent: 11/19/2021   2:46 PM CST  To: Pau Brown MD, #  Subject: RE: 12/9 or 12/15 [facility]                    With the turnaround time the case wouldn't start until 2:35 and the cleanup would also be added so it would schedule past 3 :(    I can email Aryan and ask though.   ----- Message -----  From: Pau Jaquez MD  Sent: 11/19/2021   1:50 PM CST  To: Do Emerson RN  Subject: RE: 12/9 or 12/15 [facility]                    For 12/1, I would still likely be done by 3 so would they let us?    ----- Message -----  From: Ruma Alcala  Sent: 11/19/2021   1:44 PM CST  To: Pau Brown MD, Ruma Alcala, #  Subject: RE: 12/9 or 12/15 [facility]                    No late rooms on 12/1 :(    I might be able to do 12/2 at [facility] but not first case.    Likely around 10 AM - if they schedule within the chunk of time available.    Otherwise would be 12 PM start.    I have her on 12/9 at [facility] but can move if this is what we would like to do.    - Ruma     ----- Message -----  From: Pau Jaquez MD  Sent: 11/19/2021  11:21 AM CST  To: Do Emerson RN  Subject: RE: 12/9 or 12/15 [facility]                    Would they let me do another case on 12/1?  or get me time on 12/2?  ----- Message -----  From: Do Diallo RN  Sent: 11/19/2021  11:20 AM CST  To: Pau Brown MD, Ruma Alcala  Subject: RE: 12/9 or 12/15 [facility]                    I just called the pt and she is willing to have surgery on 12/9 at [facility] but now is asking if there is a sooner date at [facility], she is willing to have surgery at a sooner date.    justin Hendrix was asking if you would have an update today?  She  apologizes but is getting very anxious about procedure.    Thanks,  Shanda  ----- Message -----  From: Pau Jaquez MD  Sent: 11/18/2021   9:41 PM CST  To: Do Emerson, RN  Subject: RE: 12/9 or 12/15 [facility]                    There is no risk to waiting one week, so either is fine.  ----- Message -----  From: Ruma Alcala  Sent: 11/18/2021   5:13 PM CST  To: Pau Brown MD, Ruma Alcala, #  Subject: RE: 12/9 or 12/15 [facility]                    Patient would like to know if there is any risk with waiting until 12/15? I believe she is worried about rupturing?    She would like a call. Working with insurance currently.     Ruma     ----- Message -----  From: Ruma Alcala  Sent: 11/17/2021   2:45 PM CST  To: Pau Brown MD, Ruma Alcala, #  Subject: 12/9 or 12/15 [facility]                        Patient deciding between surgery on 12/9 and 12/15 with Dr. Whitfield at [facility].    She is aware that she is currently scheduled 12/9.    Scheduled tentative COVID test on 12/7 and 12/13 at [facility] to hold slots.    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    She needs to discuss with HR about time off. I let her know that she does not NEED to take work off after pre-procedure COVID, but we want patients to isolate as much as possible.     She will call me by tomorrow evening with final decision. Provided direct number.    :-)     RUMA

## 2021-11-19 NOTE — TELEPHONE ENCOUNTER
Email sent to Macho Gray at 2:45 PM about adding patient to [facility] on 12/1. Awaiting response.     Called Souleymane to let her know that I am working on trying to get her surgery at [facility] sooner than 12/9. Explained that the staffing and bed shortage is impacting our surgical capabilities. She is aware that I am waiting for an answer.     I will get back to her once I hear from Macho, hoping to know by end of day Monday. I will call her on Monday to follow up if Macho says he will not have an answer by then.    Souleymane is understanding of this.   117

## 2021-11-22 ENCOUNTER — TELEPHONE (OUTPATIENT)
Dept: ONCOLOGY | Facility: CLINIC | Age: 56
End: 2021-11-22
Payer: COMMERCIAL

## 2021-11-22 ENCOUNTER — PATIENT OUTREACH (OUTPATIENT)
Dept: ONCOLOGY | Facility: CLINIC | Age: 56
End: 2021-11-22
Payer: COMMERCIAL

## 2021-11-22 NOTE — TELEPHONE ENCOUNTER
----- Message from Ruma Alcala sent at 2021 12:55 PM CST -----  Regardin/8 [facility] JORGE Comer called me to let me know that  would not work for her - due to taking time off.     She wanted 12/15 at [facility]. I called her back to explain that both  and 12/15 are at [place] - as I told her when we first spoke. She was adamant that she was told 12/15 would be at [facility], and so I explained that this may have been said in error. She really wants surgery at [facility] - so I told her I would try my best.    I was able to reach out to Aryan and get time on  at [facility] and scheduled her.     She is VERY anxious - so I scheduled a virtual visit on  on the [facility] schedule. I think she has some questions about the incisions etc.     COVID test is scheduled on  at [facility].    She is very thankful - and I told her to write down her questions so that she can ask on  during the video visit.    - Ruma   ----- Message -----  From: Ruma Alcala  Sent: 2021   5:52 PM CST  To: Pau Brown MD, Ruma Alcala, #  Subject: RE:  or 12/15 Jorge[facility] PINO                    Email sent to Macho Isaac at 2:45 PM about adding patient to [facility] on . Awaiting response.     Called Souleymane to let her know that I am working on trying to get her surgery at [facility] on  instead of  at [facility]. Explained that the staffing and bed shortage is impacting our surgical capabilities. She is aware that I am waiting for an answer.     I will get back to her once I hear from Macho, hoping to know by end of day Monday. I will call her on Monday to follow up if Macho says he will not have an answer by then.    Souleymane is understanding of this.    - Ruma   ----- Message -----  From: Pau Jaquez MD  Sent: 2021   3:33 PM CST  To: Do Emerson RN  Subject: RE:  or 12/15 Jorgeping Chambers, If you can ask.    Otherwise We can  keep her as is on 12/9  ----- Message -----  From: Ruma Alcala  Sent: 11/19/2021   2:46 PM CST  To: Pau Brown MD, #  Subject: RE: 12/9 or 12/15 [facility]                    With the turnaround time the case wouldn't start until 2:35 and the cleanup would also be added so it would schedule past 3 :(    I can email Aryan and ask though.   ----- Message -----  From: Pau Jaquez MD  Sent: 11/19/2021   1:50 PM CST  To: Do Emerson, RN  Subject: RE: 12/9 or 12/15 [facility]                    For 12/1, I would still likely be done by 3 so would they let us?    ----- Message -----  From: Ruma Alcala  Sent: 11/19/2021   1:44 PM CST  To: Pau Brown MD, Ruma Alcala, #  Subject: RE: 12/9 or 12/15 [facility]                    No late rooms on 12/1 :(    I might be able to do 12/2 at [facility] but not first case.    Likely around 10 AM - if they schedule within the chunk of time available.    Otherwise would be 12 PM start.    I have her on 12/9 at [facility] but can move if this is what we would like to do.    - Ruma     ----- Message -----  From: Pau Jaquez MD  Sent: 11/19/2021  11:21 AM CST  To: Do Emerson, RN  Subject: RE: 12/9 or 12/15 [facility]                    Would they let me do another case on 12/1?  or get me time on 12/2?  ----- Message -----  From: Do Diallo, KELY  Sent: 11/19/2021  11:20 AM CST  To: Pau Brown MD, Ruma Alcala  Subject: RE: 12/9 or 12/15 [facility]                    I just called the pt and she is willing to have surgery on 12/9 at [facility] but now is asking if there is a sooner date at [facility], she is willing to have surgery at a sooner date.    Ruma pt was asking if you would have an update today?  She apologizes but is getting very anxious about procedure.    Thanks,  Shanda  ----- Message -----  From: Pau Jaquez MD  Sent: 11/18/2021   9:41 PM CST  To:  Isiah Alcala, Do Diallo, RN  Subject: RE: 12/9 or 12/15 [facility]                    There is no risk to waiting one week, so either is fine.  ----- Message -----  From: Isiah Alcala  Sent: 11/18/2021   5:13 PM CST  To: Pau Brown MD, Isiah Alcala, #  Subject: RE: 12/9 or 12/15 [facility]                    Patient would like to know if there is any risk with waiting until 12/15? I believe she is worried about rupturing?    She would like a call. Working with insurance currently.     Isiah     ----- Message -----  From: Isiah Alcala  Sent: 11/17/2021   2:45 PM CST  To: Pau Brown MD, Isiah Alcala, #  Subject: 12/9 or 12/15 [facility]                        Patient deciding between surgery on 12/9 and 12/15 with Dr. Whitfield at [facility].    She is aware that she is currently scheduled 12/9.    Scheduled tentative COVID test on 12/7 and 12/13 at [place] to hold slots.    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    She needs to discuss with HR about time off. I let her know that she does not NEED to take work off after pre-procedure COVID, but we want patients to isolate as much as possible.     She will call me by tomorrow evening with final decision. Provided direct number.    :-)     ISIAH

## 2021-11-22 NOTE — PROGRESS NOTES
Patient called to report that of the options she was given to schedule her surgery, she would like to have it on 12/15. Writer encouraged her to contact Ruma regarding the schedule. Patient will be faxing her [facility] paperwork to be completed.   Eulalia Dubois RN on 11/22/2021 at 9:13 AM    See updated staff messages from Ruma regarding surgery scheduling  Eulalia Dubois RN on 11/22/2021 at 3:25 PM

## 2021-11-22 NOTE — TELEPHONE ENCOUNTER
RNCC: Do Diallo; [phone], option 4     Surgery is scheduled with Dr. Whitfield on 12/8 at [facility] .  Scheduled per availability.    H&P to be completed by Surgeon.    COVID-19 test: 12/5 at [facility]    Post-op: will be scheduled by the clinic    Additional appointments (pre-op/post-op):   Video visit with Dr. Whitfield to discuss questions on 11/30 at 3 PM    The RN completed the education regarding the surgery.     Patient will receive a phone call from pre-admission nurses 1-2 days prior to surgery with arrival and start time.    The surgery packet was provided by the RN during appointment    Patient will complete COVID-19 test that was scheduled by surgical coordinator 2-4 days prior to surgery.     I spoke with the patient and was able to confirm the scheduled information. No further action needed at this time.

## 2021-11-24 NOTE — PROGRESS NOTES
Late entry from 11/16/2021:  Met with pt and son after clinic visit with Dr Whitfield for surgical education.  Pt scheduled for Laparoscopic removal of one ovary and both fallopian tubes with Dr Whitfield on 12/8/2021 at [facility].  Labs completed in clinic.  Covid test on 12/5/2021.  See pt education for detailed note.  Do Diallo RN, BSN, OCN

## 2021-11-26 NOTE — PROGRESS NOTES
Pt's surgery with Dr Whitfield has been moved up to 12/8/2021 and pt contacted per Ruma, surgery scheduler and pt aware of new date.  FMLA forms received and will complete and contact pt when ready.    Do Diallo RN, BSN, OCN

## 2021-11-30 ENCOUNTER — VIRTUAL VISIT (OUTPATIENT)
Dept: ONCOLOGY | Facility: CLINIC | Age: 56
End: 2021-11-30
Attending: OBSTETRICS & GYNECOLOGY
Payer: COMMERCIAL

## 2021-11-30 ENCOUNTER — TELEPHONE (OUTPATIENT)
Dept: ONCOLOGY | Facility: CLINIC | Age: 56
End: 2021-11-30

## 2021-11-30 DIAGNOSIS — R19.00 PELVIC MASS: Primary | ICD-10-CM

## 2021-11-30 PROCEDURE — 99214 OFFICE O/P EST MOD 30 MIN: CPT | Mod: GT | Performed by: OBSTETRICS & GYNECOLOGY

## 2021-11-30 NOTE — LETTER
2021         RE: Nolan Edwards  [address]  [place] [address]        Dear Colleague,    Thank you for referring your patient, Nolan Edwards, to the [facility]. Please see a copy of my visit note below.    Souleymane is a 56 year old who is being evaluated via a billable video visit.      How would you like to obtain your AVS? MyChart  If the video visit is dropped, the invitation should be resent by: Text to cell phone: [phone]  Will anyone else be joining your video visit? Yes, Daughter Dottie sent text link         Patient verified meds and allergies are correct via patients echeck in.    Georgiana Lowery, Virtual Facilitator/LPN    Video-Visit Details    Type of service:  Video Visit    Originating Location (pt. Location): Home    Distant Location (provider location):  [facility]     Platform used for Video Visit: ICU Metrix    Consult Notes on Referred Patient            RE: Nolan Edwards  : 1965  VICTORIA: 2021      HPI:  Nolan Edwards is 56 year old with pelvic mass.  She is accompanied today by her daughter.  She has several follow up questions regarding her upcoming procedure    Patient had been having intermittent  right lower abdominal pain, however it worsened and she thus presented to the ED with an acute worsening of right lower quadrant pain and was found to have an ovarian mass. She denies changes in her bowel/bladder habits.  No PMB    3/18/08:  1.  Abdominal myomectomy with removal of thirteen fibroids. 2.  Bilateral tubal ligation via modified [place] with PA TATI GUERRERO MD     Pathology:  Benign leiomyoma    21:   <5, CEA 0.8    21:  CT A/P :  1.  7.5 x 7.2 cm left adnexal mass indeterminate for malignancy. 2.  Diverticulosis without diverticulitis.    21:  US Pelvis:  UTERUS: 6.9 x 1.9 x 4.7 cm. Normal in size and position with no masses. ENDOMETRIUM: 3 mm. Not well-defined. No  mass identified. RIGHT OVARY: Not visualized due to bowel gas. LEFT OVARY: 9.4 x 4.3 x 7.4 cm. The entire left adnexa is replaced with a multi-locular cystic mass with duplex Doppler flow noted within the septations.No significant free fluid.    Obstetrics and Gynecology History:  , c/s x 2      Past Medical History:  Past Medical History:   Diagnosis Date     Hypothyroidism        Past Surgical History:  Past Surgical History:   Procedure Laterality Date      SECTION       MYOMECTOMY       ROTATOR CUFF REPAIR RT/LT Right        Health Maintenance:  Last Pap Smear: 19              Result: Negative, HPV negative  She has not had a history of abnormal Pap smears.    Last Mammogram: 20              Result: normal      She has not had a history of abnormal mammograms.    Last Colonoscopy: 17              Result: Polyp, repeat 5 years       Social History:  Lives with her  and son, feels safe at home.  Works as a  in [employer] public schools.  Enjoys spending time outside.  Does not have an advanced directive on file and would like her  and children to be her POA.  Would like full resuscitation if reversible cause is identified, however would not like to be kept on life sustaining measures long-term.     Family History:   The patient's family history is significant for.  Family History   Problem Relation Age of Onset     Cancer No family hx of          Physical Exam:   GENERAL: Healthy, alert and no distress  EYES: Eyes grossly normal to inspection.  No discharge or erythema, or obvious scleral/conjunctival abnormalities.  HENT: Normal cephalic/atraumatic.  External ears, nose and mouth without ulcers or lesions.  No nasal drainage visible.  NECK: No asymmetry, visible masses or scars  RESP: No audible wheeze, cough, or visible cyanosis.  No visible retractions or increased work of breathing.    MS: No gross musculoskeletal defects noted.  Normal range of  motion.  No visible edema.  SKIN: Visible skin clear. No significant rash, abnormal pigmentation or lesions.  NEURO: Cranial nerves grossly intact.  Mentation and speech appropriate for age.  PSYCH: Mentation appears normal, affect normal/bright, judgement and insight intact, normal speech and appearance well-groomed.     Labs:  None       Assessment:    Nolan Edwards is a 56 year old woman with a new diagnosis of pelvic mass.     A total of 20 minutes was spent on patient care today.      Plan:     1.)    Pelvic mass-.We reviewed the possible etiologies including benign, malignant and borderline and the role of frozen section.  I reiterated that given her CT findings and normal , I believe this is most consistent with a benign etiology.  We again reviewed risks and benefits of bilateral vs unilateral oophorectomy if benign etiology is confirmed.  She is leaning towards bilateral, however would like to defer her final decision until the day of surgery. All her questions were answered to her satisfaction.  We discussed that if malignancy is found, she would undergo standard surgical staging.  Plan for surgery as previously scheduled on 12/8.     2.) Genetic risk factors were assessed and the patient does not meet the qualifications for a referral unless malignancy is identified.      3.) Labs and/or tests ordered include:  None     4.) Health maintenance issues addressed today include pt is up to date.        Thank you for allowing us to participate in the care of your patient.         Sincerely,    Pau Whitfield MD  Gynecologic Oncology  [facility]       CC             Again, thank you for allowing me to participate in the care of your patient.        Sincerely,        Kevin Whitfield MD

## 2021-11-30 NOTE — LETTER
2021         RE: Nolan Edwards  [address]  [place] [address]        Dear Colleague,    Thank you for referring your patient, Nolan Edwards, to the [facility]. Please see a copy of my visit note below.    Souleymane is a 56 year old who is being evaluated via a billable video visit.      How would you like to obtain your AVS? MyChart  If the video visit is dropped, the invitation should be resent by: Text to cell phone: [phone]  Will anyone else be joining your video visit? Yes, Daughter Dottie sent text link         Patient verified meds and allergies are correct via patients echeck in.    Georgiana Lowery, Virtual Facilitator/LPN    Video-Visit Details    Type of service:  Video Visit    Originating Location (pt. Location): Home    Distant Location (provider location):  [facility]     Platform used for Video Visit: Confide    Consult Notes on Referred Patient            RE: Nolan Edwards  : 1965  VICTORIA: 2021      HPI:  Nolan Edwards is 56 year old with pelvic mass.  She is accompanied today by her daughter.  She has several follow up questions regarding her upcoming procedure    Patient had been having intermittent  right lower abdominal pain, however it worsened and she thus presented to the ED with an acute worsening of right lower quadrant pain and was found to have an ovarian mass. She denies changes in her bowel/bladder habits.  No PMB    3/18/08:  1.  Abdominal myomectomy with removal of thirteen fibroids. 2.  Bilateral tubal ligation via modified [place] with PA TATI GUERRERO MD     Pathology:  Benign leiomyoma    21:   <5, CEA 0.8    21:  CT A/P :  1.  7.5 x 7.2 cm left adnexal mass indeterminate for malignancy. 2.  Diverticulosis without diverticulitis.    21:  US Pelvis:  UTERUS: 6.9 x 1.9 x 4.7 cm. Normal in size and position with no masses. ENDOMETRIUM: 3 mm. Not well-defined. No  mass identified. RIGHT OVARY: Not visualized due to bowel gas. LEFT OVARY: 9.4 x 4.3 x 7.4 cm. The entire left adnexa is replaced with a multi-locular cystic mass with duplex Doppler flow noted within the septations.No significant free fluid.    Obstetrics and Gynecology History:  , c/s x 2      Past Medical History:  Past Medical History:   Diagnosis Date     Hypothyroidism        Past Surgical History:  Past Surgical History:   Procedure Laterality Date      SECTION       MYOMECTOMY       ROTATOR CUFF REPAIR RT/LT Right        Health Maintenance:  Last Pap Smear: 19              Result: Negative, HPV negative  She has not had a history of abnormal Pap smears.    Last Mammogram: 20              Result: normal      She has not had a history of abnormal mammograms.    Last Colonoscopy: 17              Result: Polyp, repeat 5 years       Social History:  Lives with her  and son, feels safe at home.  Works as a  in [employer] public schools.  Enjoys spending time outside.  Does not have an advanced directive on file and would like her  and children to be her POA.  Would like full resuscitation if reversible cause is identified, however would not like to be kept on life sustaining measures long-term.     Family History:   The patient's family history is significant for.  Family History   Problem Relation Age of Onset     Cancer No family hx of          Physical Exam:   GENERAL: Healthy, alert and no distress  EYES: Eyes grossly normal to inspection.  No discharge or erythema, or obvious scleral/conjunctival abnormalities.  HENT: Normal cephalic/atraumatic.  External ears, nose and mouth without ulcers or lesions.  No nasal drainage visible.  NECK: No asymmetry, visible masses or scars  RESP: No audible wheeze, cough, or visible cyanosis.  No visible retractions or increased work of breathing.    MS: No gross musculoskeletal defects noted.  Normal range of  motion.  No visible edema.  SKIN: Visible skin clear. No significant rash, abnormal pigmentation or lesions.  NEURO: Cranial nerves grossly intact.  Mentation and speech appropriate for age.  PSYCH: Mentation appears normal, affect normal/bright, judgement and insight intact, normal speech and appearance well-groomed.     Labs:  None       Assessment:    Nolan Edwards is a 56 year old woman with a new diagnosis of pelvic mass.     A total of 20 minutes was spent on patient care today.      Plan:     1.)    Pelvic mass-.We reviewed the possible etiologies including benign, malignant and borderline and the role of frozen section.  I reiterated that given her CT findings and normal , I believe this is most consistent with a benign etiology.  We again reviewed risks and benefits of bilateral vs unilateral oophorectomy if benign etiology is confirmed.  She is leaning towards bilateral, however would like to defer her final decision until the day of surgery. All her questions were answered to her satisfaction.  We discussed that if malignancy is found, she would undergo standard surgical staging.  Plan for surgery as previously scheduled on 12/8.     2.) Genetic risk factors were assessed and the patient does not meet the qualifications for a referral unless malignancy is identified.      3.) Labs and/or tests ordered include:  None     4.) Health maintenance issues addressed today include pt is up to date.        Thank you for allowing us to participate in the care of your patient.         Sincerely,    Pau Whitfield MD  Gynecologic Oncology  [facility]       CC             Again, thank you for allowing me to participate in the care of your patient.        Sincerely,        Kevin Whitfield MD

## 2021-11-30 NOTE — PROGRESS NOTES
Souleymane is a 56 year old who is being evaluated via a billable video visit.      How would you like to obtain your AVS? MyChart  If the video visit is dropped, the invitation should be resent by: Text to cell phone: [phone]  Will anyone else be joining your video visit? Yes, Navya Sinclair sent text link         Patient verified meds and allergies are correct via patients echeck in.    Georgiana Lowery, Virtual Facilitator/LPN    Video-Visit Details    Type of service:  Video Visit    Originating Location (pt. Location): Home    Distant Location (provider location):  [facility]     Platform used for Video Visit: [employer]

## 2021-11-30 NOTE — H&P (VIEW-ONLY)
Consult Notes on Referred Patient            RE: Nolan dEwards  : 1965  VICTORIA: 2021      HPI:  Nolan Edwards is 56 year old with pelvic mass.  She is accompanied today by her daughter.  She has several follow up questions regarding her upcoming procedure    Patient had been having intermittent  right lower abdominal pain, however it worsened and she thus presented to the ED with an acute worsening of right lower quadrant pain and was found to have an ovarian mass. She denies changes in her bowel/bladder habits.  No PMB    3/18/08:  1.  Abdominal myomectomy with removal of thirteen fibroids. 2.  Bilateral tubal ligation via modified [place] with PA TATI GUERRERO MD     Pathology:  Benign leiomyoma    21:   <5, CEA 0.8    21:  CT A/P :  1.  7.5 x 7.2 cm left adnexal mass indeterminate for malignancy. 2.  Diverticulosis without diverticulitis.    21:  US Pelvis:  UTERUS: 6.9 x 1.9 x 4.7 cm. Normal in size and position with no masses. ENDOMETRIUM: 3 mm. Not well-defined. No mass identified. RIGHT OVARY: Not visualized due to bowel gas. LEFT OVARY: 9.4 x 4.3 x 7.4 cm. The entire left adnexa is replaced with a multi-locular cystic mass with duplex Doppler flow noted within the septations.No significant free fluid.    Obstetrics and Gynecology History:  , c/s x 2      Past Medical History:  Past Medical History:   Diagnosis Date     Hypothyroidism        Past Surgical History:  Past Surgical History:   Procedure Laterality Date      SECTION       MYOMECTOMY       ROTATOR CUFF REPAIR RT/LT Right        Health Maintenance:  Last Pap Smear: 19              Result: Negative, HPV negative  She has not had a history of abnormal Pap smears.    Last Mammogram: 21              Result: normal      She has not had a history of abnormal mammograms.    Last Colonoscopy: 17              Result: Polyp, repeat 5 years       Social History:  Lives with her  and  son, feels safe at home.  Works as a  in [employer] public schools.  Enjoys spending time outside.  Does not have an advanced directive on file and would like her  and children to be her POA.  Would like full resuscitation if reversible cause is identified, however would not like to be kept on life sustaining measures long-term.     Family History:   The patient's family history is significant for.  Family History   Problem Relation Age of Onset     Cancer No family hx of          Physical Exam:   GENERAL: Healthy, alert and no distress  EYES: Eyes grossly normal to inspection.  No discharge or erythema, or obvious scleral/conjunctival abnormalities.  HENT: Normal cephalic/atraumatic.  External ears, nose and mouth without ulcers or lesions.  No nasal drainage visible.  NECK: No asymmetry, visible masses or scars  RESP: No audible wheeze, cough, or visible cyanosis.  No visible retractions or increased work of breathing.    MS: No gross musculoskeletal defects noted.  Normal range of motion.  No visible edema.  SKIN: Visible skin clear. No significant rash, abnormal pigmentation or lesions.  NEURO: Cranial nerves grossly intact.  Mentation and speech appropriate for age.  PSYCH: Mentation appears normal, affect normal/bright, judgement and insight intact, normal speech and appearance well-groomed.     Labs:  None       Assessment:    Nolan Edwards is a 56 year old woman with a new diagnosis of pelvic mass.     A total of 20 minutes was spent on patient care today.      Plan:     1.)    Pelvic mass-.We reviewed the possible etiologies including benign, malignant and borderline and the role of frozen section.  I reiterated that given her CT findings and normal , I believe this is most consistent with a benign etiology.  We again reviewed risks and benefits of bilateral vs unilateral oophorectomy if benign etiology is confirmed.  She is leaning towards bilateral, however would like to  defer her final decision until the day of surgery. All her questions were answered to her satisfaction.  We discussed that if malignancy is found, she would undergo standard surgical staging.  Plan for surgery as previously scheduled on 12/8.     2.) Genetic risk factors were assessed and the patient does not meet the qualifications for a referral unless malignancy is identified.      3.) Labs and/or tests ordered include:  None     4.) Health maintenance issues addressed today include pt is up to date.        Thank you for allowing us to participate in the care of your patient.         Sincerely,    Pau Whitfield MD  Gynecologic Oncology  [facility]       CC

## 2021-11-30 NOTE — PROGRESS NOTES
Consult Notes on Referred Patient            RE: Nolan Edwards  : 1965  VICTORIA: 2021      HPI:  Nolan Edwards is 56 year old with pelvic mass.  She is accompanied today by her daughter.  She has several follow up questions regarding her upcoming procedure    Patient had been having intermittent  right lower abdominal pain, however it worsened and she thus presented to the ED with an acute worsening of right lower quadrant pain and was found to have an ovarian mass. She denies changes in her bowel/bladder habits.  No PMB    3/18/08:  1.  Abdominal myomectomy with removal of thirteen fibroids. 2.  Bilateral tubal ligation via modified [facility] with PA TATI GUERRERO MD     Pathology:  Benign leiomyoma    21:   <5, CEA 0.8    21:  CT A/P :  1.  7.5 x 7.2 cm left adnexal mass indeterminate for malignancy. 2.  Diverticulosis without diverticulitis.    21:  US Pelvis:  UTERUS: 6.9 x 1.9 x 4.7 cm. Normal in size and position with no masses. ENDOMETRIUM: 3 mm. Not well-defined. No mass identified. RIGHT OVARY: Not visualized due to bowel gas. LEFT OVARY: 9.4 x 4.3 x 7.4 cm. The entire left adnexa is replaced with a multi-locular cystic mass with duplex Doppler flow noted within the septations.No significant free fluid.    Obstetrics and Gynecology History:  , c/s x 2      Past Medical History:  Past Medical History:   Diagnosis Date     Hypothyroidism        Past Surgical History:  Past Surgical History:   Procedure Laterality Date      SECTION       MYOMECTOMY       ROTATOR CUFF REPAIR RT/LT Right        Health Maintenance:  Last Pap Smear: 20              Result: Negative, HPV negative  She has not had a history of abnormal Pap smears.    Last Mammogram: 21              Result: normal      She has not had a history of abnormal mammograms.    Last Colonoscopy: 18              Result: Polyp, repeat 5 years       Social History:  Lives with her  and  son, feels safe at home.  Works as a  in [employer].  Enjoys spending time outside.  Does not have an advanced directive on file and would like her  and children to be her POA.  Would like full resuscitation if reversible cause is identified, however would not like to be kept on life sustaining measures long-term.     Family History:   The patient's family history is significant for.  Family History   Problem Relation Age of Onset     Cancer No family hx of          Physical Exam:   GENERAL: Healthy, alert and no distress  EYES: Eyes grossly normal to inspection.  No discharge or erythema, or obvious scleral/conjunctival abnormalities.  HENT: Normal cephalic/atraumatic.  External ears, nose and mouth without ulcers or lesions.  No nasal drainage visible.  NECK: No asymmetry, visible masses or scars  RESP: No audible wheeze, cough, or visible cyanosis.  No visible retractions or increased work of breathing.    MS: No gross musculoskeletal defects noted.  Normal range of motion.  No visible edema.  SKIN: Visible skin clear. No significant rash, abnormal pigmentation or lesions.  NEURO: Cranial nerves grossly intact.  Mentation and speech appropriate for age.  PSYCH: Mentation appears normal, affect normal/bright, judgement and insight intact, normal speech and appearance well-groomed.     Labs:  None       Assessment:    Nolan Edwards is a 56 year old woman with a new diagnosis of pelvic mass.     A total of 20 minutes was spent on patient care today.      Plan:     1.)    Pelvic mass-.We reviewed the possible etiologies including benign, malignant and borderline and the role of frozen section.  I reiterated that given her CT findings and normal , I believe this is most consistent with a benign etiology.  We again reviewed risks and benefits of bilateral vs unilateral oophorectomy if benign etiology is confirmed.  She is leaning towards bilateral, however would like to  defer her final decision until the day of surgery. All her questions were answered to her satisfaction.  We discussed that if malignancy is found, she would undergo standard surgical staging.  Plan for surgery as previously scheduled on 12/8.     2.) Genetic risk factors were assessed and the patient does not meet the qualifications for a referral unless malignancy is identified.      3.) Labs and/or tests ordered include:  None     4.) Health maintenance issues addressed today include pt is up to date.        Thank you for allowing us to participate in the care of your patient.         Sincerely,    Pau Whitfield MD  Gynecologic Oncology  [facility]       CC

## 2021-12-01 NOTE — TELEPHONE ENCOUNTER
Message left for patient to call back with any further questions or concerns based on the message from Wilma Dubois RN on 12/1/2021 at 3:34 PM

## 2021-12-02 NOTE — TELEPHONE ENCOUNTER
Late entry from 11/30/2021:  Return call from pt who will be meeting with Dr Whitfield today for virtual appt pre surgery on 12/8/2021.  Discussed with pt that writer received FMLA forms and will submit, will recommend 6 weeks off from work for surgical recovery and pt may return sooner pending symptoms with MD clearance.  Pt aware of plan.  Writer will fax forms when completed.    Do Diallo RN, BSN, OCN

## 2021-12-05 ENCOUNTER — LAB (OUTPATIENT)
Dept: URGENT CARE | Facility: URGENT CARE | Age: 56
End: 2021-12-05
Payer: COMMERCIAL

## 2021-12-05 DIAGNOSIS — Z11.59 ENCOUNTER FOR SCREENING FOR OTHER VIRAL DISEASES: ICD-10-CM

## 2021-12-05 PROCEDURE — U0003 INFECTIOUS AGENT DETECTION BY NUCLEIC ACID (DNA OR RNA); SEVERE ACUTE RESPIRATORY SYNDROME CORONAVIRUS 2 (SARS-COV-2) (CORONAVIRUS DISEASE [COVID-19]), AMPLIFIED PROBE TECHNIQUE, MAKING USE OF HIGH THROUGHPUT TECHNOLOGIES AS DESCRIBED BY CMS-2020-01-R: HCPCS

## 2021-12-05 PROCEDURE — U0005 INFEC AGEN DETEC AMPLI PROBE: HCPCS

## 2021-12-06 LAB — SARS-COV-2 RNA RESP QL NAA+PROBE: NEGATIVE

## 2021-12-07 RX ORDER — ACETAMINOPHEN 325 MG/1
325-650 TABLET ORAL EVERY 6 HOURS PRN
Qty: 100 TABLET | Refills: 0 | Status: CANCELLED | OUTPATIENT
Start: 2021-12-07

## 2021-12-07 RX ORDER — HYDROXYZINE HYDROCHLORIDE 25 MG/1
25 TABLET, FILM COATED ORAL
Status: CANCELLED | OUTPATIENT
Start: 2021-12-07

## 2021-12-07 RX ORDER — AMOXICILLIN 250 MG
1-2 CAPSULE ORAL 2 TIMES DAILY
Qty: 30 TABLET | Refills: 0 | Status: CANCELLED | OUTPATIENT
Start: 2021-12-07

## 2021-12-07 RX ORDER — OXYCODONE HYDROCHLORIDE 5 MG/1
5 TABLET ORAL
Status: CANCELLED | OUTPATIENT
Start: 2021-12-07

## 2021-12-07 RX ORDER — ACETAMINOPHEN 325 MG/1
975 TABLET ORAL ONCE
Status: CANCELLED | OUTPATIENT
Start: 2021-12-07 | End: 2021-12-07

## 2021-12-07 NOTE — PROGRESS NOTES
PTA medications updated by Medication Scribe prior to surgery via phone call with patient (last doses completed by Nurse)     Medication history sources: Patient, Surescripts and H&P  In the past week, patient estimated taking medication this percent of the time: Greater than 90%  Adherence assessment: N/A Not Observed    Significant changes made to the medication list:  None      Additional medication history information:   None    Medication reconciliation completed by provider prior to medication history? No    Time spent in this activity: 20 minutes    The information provided in this note is only as accurate as the sources available at the time of update(s)    Prior to Admission medications    Medication Sig Last Dose Taking? Auth Provider   cholecalciferol ( ULTRA STRENGTH) 50 MCG (2000 UT) CAPS Take 2,000 Units by mouth MORE THAN TWO WEEKS Yes Reported, Patient   ibuprofen (ADVIL/MOTRIN) 200 MG capsule Take 200 mg by mouth every 4 hours as needed for fever MORE THAN TWO WEEKS Yes Reported, Patient   levothyroxine (SYNTHROID/LEVOTHROID) 75 MCG tablet Take 75 mcg by mouth daily 12/7/2021 at AM Yes Reported, Patient     Medication history completed by:    Ezekiel King CPhT  Medication Scribe  [facility]

## 2021-12-07 NOTE — BRIEF OP NOTE
[facility]    Brief Operative Note    Pre-operative diagnosis: Pelvic mass [R19.00]  Post-operative diagnosis Same as pre-operative diagnosis       Endometriosis    Procedure: Procedure(s):  laparoscopic removal of bilateral ovaries and fallopian tubes, pelvic washings, lysis of adhesions  Surgeon: Surgeon(s) and Role:     * Pau Jaquez MD - Primary    Anesthesia: General     Estimated Blood Loss: 50ml  Fluids: 1100ml crystalloid  UOP: 250ml clear urine    Drains:  None  Specimens:   - Left fallopian tube and ovary  - Right fallopian tube and ovary  - Pelvic washings    Findings:     On laparoscopy, no signs of trauma on entry. Grossly normal liver, gallbladder, spleen, stomach, and appendix. Moderate adhesions between bowel and lateral pelvic sidewalls and posterior aspect of uterus. Large left multicystic ovary with clear fluid, benign on intraoperative pathology. Streak right ovary. Normal appearing remnants of bilateral fallopian tubes.  Normal appearing uterus. Small endometriosis implant on pelvic sidewall.    Complications: None  Implants: None    Mika Grimes MD MPH  OB/Gyn PGY-3  12/08/21 10:03 AM

## 2021-12-08 ENCOUNTER — HOSPITAL ENCOUNTER (OUTPATIENT)
Facility: CLINIC | Age: 56
Discharge: HOME | End: 2021-12-08
Attending: OBSTETRICS & GYNECOLOGY | Admitting: OBSTETRICS & GYNECOLOGY
Payer: COMMERCIAL

## 2021-12-08 ENCOUNTER — ANESTHESIA EVENT (OUTPATIENT)
Dept: SURGERY | Facility: CLINIC | Age: 56
End: 2021-12-08
Payer: COMMERCIAL

## 2021-12-08 ENCOUNTER — PATIENT OUTREACH (OUTPATIENT)
Dept: ONCOLOGY | Facility: CLINIC | Age: 56
End: 2021-12-08

## 2021-12-08 ENCOUNTER — ANESTHESIA (OUTPATIENT)
Dept: SURGERY | Facility: CLINIC | Age: 56
End: 2021-12-08
Payer: COMMERCIAL

## 2021-12-08 VITALS
SYSTOLIC BLOOD PRESSURE: 107 MMHG | OXYGEN SATURATION: 93 % | TEMPERATURE: 98.2 F | WEIGHT: 145.7 LBS | RESPIRATION RATE: 16 BRPM | HEIGHT: 63 IN | BODY MASS INDEX: 25.82 KG/M2 | DIASTOLIC BLOOD PRESSURE: 63 MMHG | HEART RATE: 56 BPM

## 2021-12-08 DIAGNOSIS — Z90.722 S/P BSO (BILATERAL SALPINGO-OOPHORECTOMY): Primary | ICD-10-CM

## 2021-12-08 LAB
ABO/RH(D): NORMAL
ANTIBODY SCREEN: NEGATIVE
SPECIMEN EXPIRATION DATE: NORMAL

## 2021-12-08 PROCEDURE — 250N000013 HC RX MED GY IP 250 OP 250 PS 637: Performed by: STUDENT IN AN ORGANIZED HEALTH CARE EDUCATION/TRAINING PROGRAM

## 2021-12-08 PROCEDURE — 250N000013 HC RX MED GY IP 250 OP 250 PS 637: Performed by: OBSTETRICS & GYNECOLOGY

## 2021-12-08 PROCEDURE — 258N000003 HC RX IP 258 OP 636: Performed by: NURSE ANESTHETIST, CERTIFIED REGISTERED

## 2021-12-08 PROCEDURE — 999N000141 HC STATISTIC PRE-PROCEDURE NURSING ASSESSMENT: Performed by: OBSTETRICS & GYNECOLOGY

## 2021-12-08 PROCEDURE — 88112 CYTOPATH CELL ENHANCE TECH: CPT | Mod: TC | Performed by: OBSTETRICS & GYNECOLOGY

## 2021-12-08 PROCEDURE — 710N000009 HC RECOVERY PHASE 1, LEVEL 1, PER MIN: Performed by: OBSTETRICS & GYNECOLOGY

## 2021-12-08 PROCEDURE — 36415 COLL VENOUS BLD VENIPUNCTURE: CPT | Performed by: OBSTETRICS & GYNECOLOGY

## 2021-12-08 PROCEDURE — 250N000011 HC RX IP 250 OP 636: Performed by: NURSE ANESTHETIST, CERTIFIED REGISTERED

## 2021-12-08 PROCEDURE — 250N000009 HC RX 250: Performed by: OBSTETRICS & GYNECOLOGY

## 2021-12-08 PROCEDURE — 360N000077 HC SURGERY LEVEL 4, PER MIN: Performed by: OBSTETRICS & GYNECOLOGY

## 2021-12-08 PROCEDURE — 258N000003 HC RX IP 258 OP 636: Performed by: ANESTHESIOLOGY

## 2021-12-08 PROCEDURE — 710N000012 HC RECOVERY PHASE 2, PER MINUTE: Performed by: OBSTETRICS & GYNECOLOGY

## 2021-12-08 PROCEDURE — 370N000017 HC ANESTHESIA TECHNICAL FEE, PER MIN: Performed by: OBSTETRICS & GYNECOLOGY

## 2021-12-08 PROCEDURE — 86901 BLOOD TYPING SEROLOGIC RH(D): CPT | Performed by: OBSTETRICS & GYNECOLOGY

## 2021-12-08 PROCEDURE — 250N000025 HC SEVOFLURANE, PER MIN: Performed by: OBSTETRICS & GYNECOLOGY

## 2021-12-08 PROCEDURE — 250N000009 HC RX 250: Performed by: NURSE ANESTHETIST, CERTIFIED REGISTERED

## 2021-12-08 PROCEDURE — 88305 TISSUE EXAM BY PATHOLOGIST: CPT | Mod: TC | Performed by: OBSTETRICS & GYNECOLOGY

## 2021-12-08 PROCEDURE — 250N000011 HC RX IP 250 OP 636: Performed by: SURGERY

## 2021-12-08 PROCEDURE — 272N000001 HC OR GENERAL SUPPLY STERILE: Performed by: OBSTETRICS & GYNECOLOGY

## 2021-12-08 PROCEDURE — 250N000011 HC RX IP 250 OP 636: Performed by: OBSTETRICS & GYNECOLOGY

## 2021-12-08 RX ORDER — IBUPROFEN 200 MG
200-400 TABLET ORAL EVERY 6 HOURS PRN
Qty: 100 TABLET | Refills: 0 | Status: SHIPPED | OUTPATIENT
Start: 2021-12-08 | End: 2022-08-05

## 2021-12-08 RX ORDER — FENTANYL CITRATE 0.05 MG/ML
25 INJECTION, SOLUTION INTRAMUSCULAR; INTRAVENOUS EVERY 5 MIN PRN
Status: DISCONTINUED | OUTPATIENT
Start: 2021-12-08 | End: 2021-12-08 | Stop reason: HOSPADM

## 2021-12-08 RX ORDER — OXYCODONE HYDROCHLORIDE 5 MG/1
2.5-5 TABLET ORAL EVERY 6 HOURS PRN
Qty: 6 TABLET | Refills: 0 | Status: SHIPPED | OUTPATIENT
Start: 2021-12-08 | End: 2021-12-08

## 2021-12-08 RX ORDER — HEPARIN SODIUM 5000 [USP'U]/.5ML
5000 INJECTION, SOLUTION INTRAVENOUS; SUBCUTANEOUS
Status: COMPLETED | OUTPATIENT
Start: 2021-12-08 | End: 2021-12-08

## 2021-12-08 RX ORDER — HYDROMORPHONE HCL IN WATER/PF 6 MG/30 ML
0.2 PATIENT CONTROLLED ANALGESIA SYRINGE INTRAVENOUS EVERY 5 MIN PRN
Status: DISCONTINUED | OUTPATIENT
Start: 2021-12-08 | End: 2021-12-08 | Stop reason: HOSPADM

## 2021-12-08 RX ORDER — ACETAMINOPHEN 325 MG/1
975 TABLET ORAL ONCE
Status: COMPLETED | OUTPATIENT
Start: 2021-12-08 | End: 2021-12-08

## 2021-12-08 RX ORDER — ONDANSETRON 2 MG/ML
4 INJECTION INTRAMUSCULAR; INTRAVENOUS EVERY 30 MIN PRN
Status: DISCONTINUED | OUTPATIENT
Start: 2021-12-08 | End: 2021-12-08 | Stop reason: HOSPADM

## 2021-12-08 RX ORDER — GLYCOPYRROLATE 0.2 MG/ML
INJECTION, SOLUTION INTRAMUSCULAR; INTRAVENOUS PRN
Status: DISCONTINUED | OUTPATIENT
Start: 2021-12-08 | End: 2021-12-08

## 2021-12-08 RX ORDER — SODIUM CHLORIDE, SODIUM LACTATE, POTASSIUM CHLORIDE, CALCIUM CHLORIDE 600; 310; 30; 20 MG/100ML; MG/100ML; MG/100ML; MG/100ML
INJECTION, SOLUTION INTRAVENOUS CONTINUOUS
Status: DISCONTINUED | OUTPATIENT
Start: 2021-12-08 | End: 2021-12-08 | Stop reason: HOSPADM

## 2021-12-08 RX ORDER — PROPOFOL 10 MG/ML
INJECTION, EMULSION INTRAVENOUS PRN
Status: DISCONTINUED | OUTPATIENT
Start: 2021-12-08 | End: 2021-12-08

## 2021-12-08 RX ORDER — ONDANSETRON 4 MG/1
4 TABLET, ORALLY DISINTEGRATING ORAL EVERY 30 MIN PRN
Status: DISCONTINUED | OUTPATIENT
Start: 2021-12-08 | End: 2021-12-08 | Stop reason: HOSPADM

## 2021-12-08 RX ORDER — ONDANSETRON 2 MG/ML
INJECTION INTRAMUSCULAR; INTRAVENOUS PRN
Status: DISCONTINUED | OUTPATIENT
Start: 2021-12-08 | End: 2021-12-08

## 2021-12-08 RX ORDER — NEOSTIGMINE METHYLSULFATE 1 MG/ML
VIAL (ML) INJECTION PRN
Status: DISCONTINUED | OUTPATIENT
Start: 2021-12-08 | End: 2021-12-08

## 2021-12-08 RX ORDER — OXYCODONE HYDROCHLORIDE 5 MG/1
5 TABLET ORAL
Status: COMPLETED | OUTPATIENT
Start: 2021-12-08 | End: 2021-12-08

## 2021-12-08 RX ORDER — OXYCODONE HYDROCHLORIDE 5 MG/1
5 TABLET ORAL EVERY 4 HOURS PRN
Status: DISCONTINUED | OUTPATIENT
Start: 2021-12-08 | End: 2021-12-08 | Stop reason: HOSPADM

## 2021-12-08 RX ORDER — AMOXICILLIN 250 MG
1-2 CAPSULE ORAL 2 TIMES DAILY
Qty: 30 TABLET | Refills: 0 | Status: SHIPPED | OUTPATIENT
Start: 2021-12-08 | End: 2022-08-05

## 2021-12-08 RX ORDER — ACETAMINOPHEN 325 MG/1
325-650 TABLET ORAL EVERY 6 HOURS PRN
Qty: 100 TABLET | Refills: 0 | Status: SHIPPED | OUTPATIENT
Start: 2021-12-08

## 2021-12-08 RX ORDER — PHENAZOPYRIDINE HYDROCHLORIDE 200 MG/1
200 TABLET, FILM COATED ORAL ONCE
Status: COMPLETED | OUTPATIENT
Start: 2021-12-08 | End: 2021-12-08

## 2021-12-08 RX ORDER — LIDOCAINE HYDROCHLORIDE 20 MG/ML
INJECTION, SOLUTION INFILTRATION; PERINEURAL PRN
Status: DISCONTINUED | OUTPATIENT
Start: 2021-12-08 | End: 2021-12-08

## 2021-12-08 RX ORDER — PROPOFOL 10 MG/ML
INJECTION, EMULSION INTRAVENOUS CONTINUOUS PRN
Status: DISCONTINUED | OUTPATIENT
Start: 2021-12-08 | End: 2021-12-08

## 2021-12-08 RX ORDER — MAGNESIUM HYDROXIDE 1200 MG/15ML
LIQUID ORAL PRN
Status: DISCONTINUED | OUTPATIENT
Start: 2021-12-08 | End: 2021-12-08 | Stop reason: HOSPADM

## 2021-12-08 RX ORDER — OXYCODONE HYDROCHLORIDE 5 MG/1
2.5-5 TABLET ORAL EVERY 6 HOURS PRN
Qty: 6 TABLET | Refills: 0 | Status: SHIPPED | OUTPATIENT
Start: 2021-12-08 | End: 2022-08-05

## 2021-12-08 RX ADMIN — ROCURONIUM BROMIDE 50 MG: 50 INJECTION, SOLUTION INTRAVENOUS at 08:45

## 2021-12-08 RX ADMIN — PHENAZOPYRIDINE HYDROCHLORIDE 200 MG: 200 TABLET ORAL at 07:17

## 2021-12-08 RX ADMIN — GLYCOPYRROLATE 0.4 MG: 0.2 INJECTION, SOLUTION INTRAMUSCULAR; INTRAVENOUS at 09:46

## 2021-12-08 RX ADMIN — PROPOFOL 200 MG: 10 INJECTION, EMULSION INTRAVENOUS at 08:45

## 2021-12-08 RX ADMIN — HEPARIN SODIUM 5000 UNITS: 5000 INJECTION INTRAVENOUS; SUBCUTANEOUS at 07:28

## 2021-12-08 RX ADMIN — MIDAZOLAM 2 MG: 1 INJECTION INTRAMUSCULAR; INTRAVENOUS at 08:42

## 2021-12-08 RX ADMIN — FENTANYL CITRATE 25 MCG: 50 INJECTION, SOLUTION INTRAMUSCULAR; INTRAVENOUS at 10:23

## 2021-12-08 RX ADMIN — LIDOCAINE HYDROCHLORIDE 80 MG: 20 INJECTION, SOLUTION INFILTRATION; PERINEURAL at 08:56

## 2021-12-08 RX ADMIN — NEOSTIGMINE METHYLSULFATE 3 MG: 1 INJECTION, SOLUTION INTRAVENOUS at 09:46

## 2021-12-08 RX ADMIN — SODIUM CHLORIDE, POTASSIUM CHLORIDE, SODIUM LACTATE AND CALCIUM CHLORIDE: 600; 310; 30; 20 INJECTION, SOLUTION INTRAVENOUS at 09:36

## 2021-12-08 RX ADMIN — ONDANSETRON 4 MG: 2 INJECTION INTRAMUSCULAR; INTRAVENOUS at 09:51

## 2021-12-08 RX ADMIN — HYDROMORPHONE HYDROCHLORIDE 0.2 MG: 0.2 INJECTION, SOLUTION INTRAMUSCULAR; INTRAVENOUS; SUBCUTANEOUS at 11:14

## 2021-12-08 RX ADMIN — FENTANYL CITRATE 25 MCG: 50 INJECTION, SOLUTION INTRAMUSCULAR; INTRAVENOUS at 10:35

## 2021-12-08 RX ADMIN — SODIUM CHLORIDE, POTASSIUM CHLORIDE, SODIUM LACTATE AND CALCIUM CHLORIDE: 600; 310; 30; 20 INJECTION, SOLUTION INTRAVENOUS at 07:26

## 2021-12-08 RX ADMIN — OXYCODONE HYDROCHLORIDE 5 MG: 5 TABLET ORAL at 11:11

## 2021-12-08 RX ADMIN — HYDROMORPHONE HYDROCHLORIDE 0.2 MG: 0.2 INJECTION, SOLUTION INTRAMUSCULAR; INTRAVENOUS; SUBCUTANEOUS at 10:49

## 2021-12-08 RX ADMIN — ACETAMINOPHEN 975 MG: 325 TABLET, FILM COATED ORAL at 11:11

## 2021-12-08 RX ADMIN — PROPOFOL 25 MCG/KG/MIN: 10 INJECTION, EMULSION INTRAVENOUS at 08:52

## 2021-12-08 RX ADMIN — PHENYLEPHRINE HYDROCHLORIDE 100 MCG: 10 INJECTION INTRAVENOUS at 09:29

## 2021-12-08 ASSESSMENT — MIFFLIN-ST. JEOR: SCORE: 1215.02

## 2021-12-08 ASSESSMENT — ACTIVITIES OF DAILY LIVING (ADL)
ADLS_ACUITY_SCORE: 3
ADLS_ACUITY_SCORE: 12
ADLS_ACUITY_SCORE: 3

## 2021-12-08 ASSESSMENT — ENCOUNTER SYMPTOMS: DYSRHYTHMIAS: 0

## 2021-12-08 NOTE — ANESTHESIA PREPROCEDURE EVALUATION
Anesthesia Pre-Procedure Evaluation    Patient: Nolan Rojou   MRN: [id number] : 1965        Preoperative Diagnosis: Pelvic mass [R19.00]    Procedure : Procedure(s):  laparoscopic removal of one ovary and both fallopian tubes, possible cancer surgery,  possible open          Past Medical History:   Diagnosis Date     Hypothyroidism       Past Surgical History:   Procedure Laterality Date      SECTION       MYOMECTOMY       ROTATOR CUFF REPAIR RT/LT Right       Allergies   Allergen Reactions     Ceftriaxone      Other reaction(s): Edema     Cephalexin Hives     Other reaction(s): Edema     Yiclcoqo-Fqnnoji-Cwnhza [Fluocinolone]      Quinolones Other (See Comments)     Per pt, not completely sure on cipro allergy  Per pt, not completely sure on cipro allergy       Salicylates Other (See Comments)     Other reaction(s): GI Upset  Not documented        Social History     Tobacco Use     Smoking status: Never Smoker     Smokeless tobacco: Never Used   Substance Use Topics     Alcohol use: Never      Wt Readings from Last 1 Encounters:   21 66.1 kg (145 lb 11.2 oz)        Anesthesia Evaluation            ROS/MED HX  ENT/Pulmonary:  - neg pulmonary ROS     Neurologic:  - neg neurologic ROS     Cardiovascular:    (-) CHF and arrhythmias   METS/Exercise Tolerance: >4 METS    Hematologic:       Musculoskeletal:       GI/Hepatic: Comment: diverticulosis       Renal/Genitourinary: Comment: Ovarian mass      Endo:     (+) thyroid problem, hypothyroidism,     Psychiatric/Substance Use:       Infectious Disease:       Malignancy:       Other:            Physical Exam    Airway        Mallampati: I   TM distance: > 3 FB   Neck ROM: full   Mouth opening: > 3 cm    Respiratory Devices and Support         Dental  no notable dental history         Cardiovascular   cardiovascular exam normal          Pulmonary   pulmonary exam normal                OUTSIDE LABS:  CBC:   Lab Results   Component Value Date    WBC  6.8 11/16/2021    WBC 7.9 11/11/2021    HGB 13.6 11/16/2021    HGB 13.7 11/11/2021    HCT 41.7 11/16/2021    HCT 41.8 11/11/2021     11/16/2021     11/11/2021     BMP:   Lab Results   Component Value Date     11/16/2021     11/11/2021    POTASSIUM 4.0 11/16/2021    POTASSIUM 4.1 11/11/2021    CHLORIDE 107 11/16/2021    CHLORIDE 109 11/11/2021    CO2 28 11/16/2021    CO2 27 11/11/2021    BUN 14 11/16/2021    BUN 19 11/11/2021    CR 0.58 11/16/2021    CR 0.55 11/11/2021    GLC 95 11/16/2021     (H) 11/11/2021     COAGS: No results found for: PTT, INR, FIBR  POC:   Lab Results   Component Value Date    HCG Negative 12/01/2010     HEPATIC:   Lab Results   Component Value Date    ALBUMIN 3.7 11/16/2021    PROTTOTAL 8.1 11/16/2021    ALT 23 11/16/2021    AST 22 11/16/2021    ALKPHOS 72 11/16/2021    BILITOTAL 0.2 11/16/2021     OTHER:   Lab Results   Component Value Date    DARLIN 9.2 11/16/2021    LIPASE 138 11/11/2021       Anesthesia Plan    ASA Status:  2   NPO Status:  NPO Appropriate    Anesthesia Type: General.     - Airway: ETT   Induction: Intravenous, Propofol.   Maintenance: Balanced.        Consents    Anesthesia Plan(s) and associated risks, benefits, and realistic alternatives discussed. Questions answered and patient/representative(s) expressed understanding.    - Discussed:     - Discussed with:  Patient         Postoperative Care    Pain management: IV analgesics, Multi-modal analgesia.   PONV prophylaxis: Ondansetron (or other 5HT-3), Dexamethasone or Solumedrol, Background Propofol Infusion     Comments:                Kameron Goodman MD

## 2021-12-08 NOTE — PROGRESS NOTES
Gyn Onc Progress Note    I met with the patient and her  in pre-op.  She has decided that she would like to proceed with bilateral salpingo-oophorectomy regardless of pathology.  We discussed the role of frozen section analysis again and that further surgical procedures would be completed if borderline or malignant pathology was discovered.  If benign, we would do a BSO only.    Pau Whitfield MD  Gynecologic Oncology  [facility]

## 2021-12-08 NOTE — ANESTHESIA POSTPROCEDURE EVALUATION
Patient: [facility] Jerry    Procedure: Procedure(s):  laparoscopic removal of bilateral ovaries and fallopian tubes, pelvic washings, lysis of adhesions       Diagnosis:Pelvic mass [R19.00]  Diagnosis Additional Information: No value filed.    Anesthesia Type:  General    Note:  Disposition: Inpatient   Postop Pain Control: Uneventful            Sign Out: Well controlled pain   PONV: No   Neuro/Psych: Uneventful            Sign Out: Acceptable/Baseline neuro status   Airway/Respiratory: Uneventful            Sign Out: Acceptable/Baseline resp. status   CV/Hemodynamics: Uneventful            Sign Out: Acceptable CV status   Other NRE: NONE   DID A NON-ROUTINE EVENT OCCUR? No           Last vitals:  Vitals Value Taken Time   /69 12/08/21 1142   Temp 36.8  C (98.2  F) 12/08/21 1142   Pulse 61 12/08/21 1143   Resp 16 12/08/21 1144   SpO2 98 % 12/08/21 1145   Vitals shown include unvalidated device data.    Electronically Signed By: Kameron Goodman MD  December 8, 2021  4:19 PM

## 2021-12-08 NOTE — PROGRESS NOTES
FMLA form completed and signed per Dr Whitfield.  Pt scheduled for surgery 12/8/2021 with tentative 6 weeks off for surgical recovery.  Faxed to Shellie Garcia at [phone].     Do Diallo RN, BSN, OCN

## 2021-12-08 NOTE — OP NOTE
Gynecologic Oncology Operative Report    12/8/2021  [facility]  [id number]    PREOPERATIVE DIAGNOSIS: Pelvic mass    POSTOPERATIVE DIAGNOSIS: Benign on frozen section, final pathology pending    PROCEDURES: Laparoscopic bilateral salpingo-oophorectomy    SURGEON: Pau Whitfield MD     ASSISTANTS:  Mika Grimes MD, PGY-3.     ANESTHESIA: General endotracheal     ESTIMATED BLOOD LOSS: 50 cc     IV FLUIDS: 1100 cc crystalloid    URINE OUTPUT: 250 cc clear urine     INDICATIONS: Nolan Edwards is a 56 year old who presented with intermittent right lower quadrant pain.  She underwent a CT which revealed a 7.5 cm left adnexal mass without evidence of metastatic disease.  She then had a pelvic US which confirmed a 9.4 cm complex, multi-locular cystic mass in the left adnexa.   was drawn and was normal at <5.  Following a thorough discussion of the risks, benefits, indications and alternatives she consented to the above procedure.    FINDINGS: On EUA the patient had normal external genitalia and a palpable adnexal mass filling the posterior cul-de-sac which was mobile.  On laparoscopy there were some filmy adhesions from the colon to the posterior uterus where she had had her previous myomectomy as well as to the bilateral adnexa.  The left ovary was replaced by an approximately 10 cm simple appearing cystic mass which filled the cul-de-sac and had mild adhesions to the posterior uterus and left pelvic sidewall.  The right ovary was normal in appearance.  There was evidence of previous tubal ligation.  The remainder of the abdominal and pelvic surveys were unremrakable.  Frozen section results showed benign findings.    SPECIMENS:   1.  Pelvic washings  2.  Right and left ovaries and fallopian tubes    COMPLICATIONS: None.     CONDITION: Stable to PACU.     PROCEDURE:   Consent was reviewed with the patient in the preoperative setting and confirmed. She received heparin for venous thrombosis prevention. The  patient was transferred to the operating room and placed in dorsal supine position. General anesthetic was obtained in the usual manner without noted difficulties. The patient was then positioned onto Andry stirrups and an exam under anesthesia was performed with findings as described above.  The patient was prepped and draped for the above-mentioned procedure and Deleon catheter was then placed under sterile techniques.  Timeout was called at which point the patient's name, procedure and operative site was confirmed by the operative team.      Attention was then turned to the upper abdomen. Initially, an incision was made at the umbilicus and the Veress needle introduced through this stab incision. The abdomen was insufflated with an opening pressure of 2 mmHg. The Veress needle was removed. The initial midline 5 mm port was inserted without difficulties and initial survey revealed no damage to underlying structures.  The left lateral 12 mm and right lateral 5 mm ports were then placed under direct visualization without any injury noted to underlying structures. At this point, the patient was placed into steep Trendelenburg. The pelvis was inspected as well as the upper abdomen with findings as noted above. Pelvic washings were obtained and sent for cytology. Bowels were packed up into the upper abdomen with gentle traction. The procedure was initiated with lysis of adhesions to free the uterus and bilateral adnexa from their adhesions to the colon which was done sharply.  We then did more lysis of adhesions to free the left ovary.  Once normal anatomy was restored, the bilateral ureters were identified transperitoneally.  The IP ligaments were isolated, cauterized and divided ensuring to stay well above the level of the ureter.  The bilateral utero-ovarian ligaments were then cauterized and divided.  The right and left ovaries and fallopian tubes were placed into an EndoCatch bag, removed from the abdomen and sent  for pathology which did return as benign. We closed the fascia on the 12 mm incision using the Rhett-Lozano device. All laparoscopic instruments and ports were now removed and CO2 allowed to escape from the abdomen. Skin was reapproximated with 4-0 Vicryl sutures and Dermabond applied. The patient tolerated the procedure well and was taken to the recovery room in stable condition.    Sponge, lap and needle counts were reported as correct x2 and instrument count was correct x1.     Pau Whitfield MD  Gynecologic Oncology  [facility]

## 2021-12-08 NOTE — OR NURSING
Resident at bedside, patient does NOT need to void before discharge.  If pt discharges without voiding review with patient what to do at home if unable to void at home.

## 2021-12-08 NOTE — ANESTHESIA PROCEDURE NOTES
Airway       Patient location during procedure: OR       Procedure Start/Stop Times: 12/8/2021 8:47 AM  Staff -        Performed By: CRNA  Consent for Airway        Urgency: elective  Indications and Patient Condition       Indications for airway management: kiara-procedural       Induction type:intravenous       Mask difficulty assessment: 2 - vent by mask + OA or adjuvant +/- NMBA    Final Airway Details       Final airway type: endotracheal airway       Successful airway: ETT - single  Endotracheal Airway Details        ETT size (mm): 7.0       Successful intubation technique: direct laryngoscopy       DL Blade Type: MAC 3       Grade View of Cords: 1       Adjucts: stylet       Position: Right       Measured from: lips       Secured at (cm): 21       Bite block used: None    Post intubation assessment        Placement verified by: capnometry, equal breath sounds and chest rise        Number of attempts at approach: 1       Secured with: pink tape       Ease of procedure: easy       Dentition: Intact and Unchanged

## 2021-12-08 NOTE — ANESTHESIA CARE TRANSFER NOTE
Patient: Baronlelecremy Jerry    Procedure: Procedure(s):  laparoscopic removal of bilateral ovaries and fallopian tubes, pelvic washings, lysis of adhesions       Diagnosis: Pelvic mass [R19.00]  Diagnosis Additional Information: No value filed.    Anesthesia Type:   General     Note:    Oropharynx: oropharynx clear of all foreign objects  Level of Consciousness: awake  Oxygen Supplementation: face mask  Level of Supplemental Oxygen (L/min / FiO2): 6  Independent Airway: airway patency satisfactory and stable  Dentition: dentition unchanged      Patient transferred to: PACU  Comments: Neuromuscular blockade reversed after TOF 4/4, spontaneous respirations, adequate tidal volumes, followed commands to voice, oropharynx suctioned with soft flexible catheter, extubated atraumatically, extubated with suction, airway patent after extubation.  Oxygen via facemask at 6 liters per minute to PACU. Oxygen tubing connected to wall O2 in PACU, SpO2, NiBP, and EKG monitors and alarms on and functioning, Rell Hugger warmer connected to patient gown, report on patient's clinical status given to PACU RN, RN questions answered.     Handoff Report: Identifed the Patient, Identified the Reponsible Provider, Reviewed the pertinent medical history, Discussed the surgical course, Reviewed Intra-OP anesthesia mangement and issues during anesthesia, Set expectations for post-procedure period and Allowed opportunity for questions and acknowledgement of understanding      Vitals:  Vitals Value Taken Time   BP     Temp     Pulse 58 12/08/21 1003   Resp 11 12/08/21 1003   SpO2 97 % 12/08/21 1003   Vitals shown include unvalidated device data.    Electronically Signed By: COURTNEY Tsang CRNA  December 8, 2021  10:04 AM

## 2021-12-09 ENCOUNTER — PATIENT OUTREACH (OUTPATIENT)
Dept: ONCOLOGY | Facility: CLINIC | Age: 56
End: 2021-12-09
Payer: COMMERCIAL

## 2021-12-09 ENCOUNTER — PATIENT OUTREACH (OUTPATIENT)
Dept: CARE COORDINATION | Facility: CLINIC | Age: 56
End: 2021-12-09
Payer: COMMERCIAL

## 2021-12-09 DIAGNOSIS — Z71.89 OTHER SPECIFIED COUNSELING: ICD-10-CM

## 2021-12-09 LAB
PATH REPORT.COMMENTS IMP SPEC: NORMAL
PATH REPORT.FINAL DX SPEC: NORMAL
PATH REPORT.FINAL DX SPEC: NORMAL
PATH REPORT.GROSS SPEC: NORMAL
PATH REPORT.GROSS SPEC: NORMAL
PATH REPORT.INTRAOP OBS SPEC DOC: NORMAL
PATH REPORT.MICROSCOPIC SPEC OTHER STN: NORMAL
PATH REPORT.MICROSCOPIC SPEC OTHER STN: NORMAL
PATH REPORT.RELEVANT HX SPEC: NORMAL
PHOTO IMAGE: NORMAL

## 2021-12-09 PROCEDURE — 88112 CYTOPATH CELL ENHANCE TECH: CPT | Mod: 26 | Performed by: PATHOLOGY

## 2021-12-09 PROCEDURE — 88307 TISSUE EXAM BY PATHOLOGIST: CPT | Mod: 26 | Performed by: PATHOLOGY

## 2021-12-09 PROCEDURE — 88305 TISSUE EXAM BY PATHOLOGIST: CPT | Mod: 26 | Performed by: PATHOLOGY

## 2021-12-09 PROCEDURE — 88331 PATH CONSLTJ SURG 1 BLK 1SPC: CPT | Mod: 26 | Performed by: PATHOLOGY

## 2021-12-09 NOTE — PROGRESS NOTES
"Clinic Care Coordination Contact  [facility]: Post-Discharge Note  SITUATION                                                      Admission:    Admission Date: 12/08/21   Reason for Admission: Pelvic mass  Discharge:   Discharge Date: 12/08/21  Discharge Diagnosis: Pelvic mass    SERGIO Edwards is a 56 year old female who presents with chief complaint abdominal pain.  Patient reports pain is localized to the right side of her lower abdomen with onset about 16 hours ago.  She denies similar symptoms in the past.  She reports pain is lower in her abdomen and feels like it is next to her bladder.  She denies nausea or vomiting.  Denies fevers or chills.  She tried ibuprofen with minimal relief.  She presented to urgent care first and was subsequently sent to emergency department for further evaluation and treatment.  She reports she has had prior abdominal surgery for fibroid removal back in 2009.  She reports that her last menses was around then.  She denies any vaginal bleeding.    ASSESSMENT      Enrollment  Primary Care Care Coordination Status: Not a Candidate    Discharge Assessment  How are you doing now that you are home?: \"I'm actually feeling really good\"  How are your symptoms? (Red Flag symptoms escalate to triage hotline per guidelines): Improved  Do you feel your condition is stable enough to be safe at home until your provider visit?: Yes  Does the patient have their discharge instructions? : Yes  Does the patient have questions regarding their discharge instructions? : No  Were you started on any new medications or were there changes to any of your previous medications? : Yes  Does the patient have all of their medications?: Yes  Do you have questions regarding any of your medications? : No  Do you have all of your needed medical supplies or equipment (DME)?  (i.e. oxygen tank, CPAP, cane, etc.): Yes  Discharge follow-up appointment " scheduled within 14 calendar days? : Yes  Discharge Follow Up Appointment Date: 12/28/21  Discharge Follow Up Appointment Scheduled with?: Specialty Care Provider                  PLAN                                                      Outpatient Plan:  Return Visit with Kevin Whitfield MD  Tuesday Dec 28, 2021 11:30 AM    Future Appointments   Date Time Provider Department Center   12/28/2021 11:30 AM Pau Jaquez MD [facility]         For any urgent concerns, please contact our 24 hour nurse triage line: [phone] ([phone])         Henny Macdonald  Community Health Worker  Connected Care Resource [facility]  [phone]

## 2021-12-09 NOTE — PROGRESS NOTES
Pt s/p Laparoscopic BSO with Dr Whitfield on 12/8/2021.  Pt states she is feeling great and abdominal pain controlled with ibuprofen and tylenol.  Pt up walking in her home, eating and drinking well.  Normal bladder function, no BM yet but passing gas.  Pt states she has not looked at her incisions but states her  states they look ok, denies fevers.  Pt has not taken senekot yet and will start as prescribed.  Pt has no concerns at this time and encouraged to call back with further questions and pt verbalized understanding.    Do Diallo, RN, BSN, OCN

## 2021-12-18 ENCOUNTER — HEALTH MAINTENANCE LETTER (OUTPATIENT)
Age: 56
End: 2021-12-18

## 2021-12-28 ENCOUNTER — ONCOLOGY VISIT (OUTPATIENT)
Dept: ONCOLOGY | Facility: CLINIC | Age: 56
End: 2021-12-28
Attending: OBSTETRICS & GYNECOLOGY
Payer: COMMERCIAL

## 2021-12-28 VITALS
HEIGHT: 63 IN | OXYGEN SATURATION: 98 % | WEIGHT: 146.8 LBS | SYSTOLIC BLOOD PRESSURE: 127 MMHG | TEMPERATURE: 97.6 F | HEART RATE: 87 BPM | DIASTOLIC BLOOD PRESSURE: 82 MMHG | BODY MASS INDEX: 26.01 KG/M2

## 2021-12-28 DIAGNOSIS — R19.00 PELVIC MASS: Primary | ICD-10-CM

## 2021-12-28 PROCEDURE — G0463 HOSPITAL OUTPT CLINIC VISIT: HCPCS

## 2021-12-28 PROCEDURE — 99212 OFFICE O/P EST SF 10 MIN: CPT | Performed by: OBSTETRICS & GYNECOLOGY

## 2021-12-28 ASSESSMENT — MIFFLIN-ST. JEOR: SCORE: 1216.83

## 2021-12-28 ASSESSMENT — PAIN SCALES - GENERAL: PAINLEVEL: NO PAIN (0)

## 2021-12-28 NOTE — LETTER
2021         RE: Nolan Edwards  [address]  [place] [address]        Dear Colleague,    Thank you for referring your patient, Nolan Edwards, to the [facility]. Please see a copy of my visit note below.    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  She is unaccompanied today.  She is doing well post-operatively.  Eating and drinking normally.  No concerns with bowel/bladder function.  Minimal pain in her lower abdomen but not requiring pain medications.  She is hopeful to return to work sooner than planned    Clinical Course:    Patient had been having intermittent  right lower abdominal pain, however it worsened and she thus presented to the ED with an acute worsening of right lower quadrant pain and was found to have an ovarian mass. She denies changes in her bowel/bladder habits.  No PMB    3/18/08:  1.  Abdominal myomectomy with removal of thirteen fibroids. 2.  Bilateral tubal ligation via modified [place] with PA TATI GUERRERO MD     Pathology:  Benign leiomyoma    21:   <5, CEA 0.8    21:  CT A/P :  1.  7.5 x 7.2 cm left adnexal mass indeterminate for malignancy. 2.  Diverticulosis without diverticulitis.    21:  US Pelvis:  UTERUS: 6.9 x 1.9 x 4.7 cm. Normal in size and position with no masses. ENDOMETRIUM: 3 mm. Not well-defined. No mass identified. RIGHT OVARY: Not visualized due to bowel gas. LEFT OVARY: 9.4 x 4.3 x 7.4 cm. The entire left adnexa is replaced with a multi-locular cystic mass with duplex Doppler flow noted within the septations.No significant free fluid.    21:  Laparoscopic bilateral salpingo-oophorectomy   Pathology:  Serous cystadenoma    Obstetrics and Gynecology History:  , c/s x 2      Past Medical History:  Past Medical History:   Diagnosis Date     Hypothyroidism        Past  "Surgical History:  Past Surgical History:   Procedure Laterality Date      SECTION       LAPAROSCOPIC SALPINGO-OOPHORECTOMY, DISSECT LYMPH NODE Bilateral 2021    Procedure: laparoscopic removal of bilateral ovaries and fallopian tubes, pelvic washings, lysis of adhesions;  Surgeon: Pau Jaquez MD;  Location: [facility] OR     MYOMECTOMY       ROTATOR CUFF REPAIR RT/LT Right        Health Maintenance:  Last Pap Smear: 20              Result: Negative, HPV negative  She has not had a history of abnormal Pap smears.    Last Mammogram: 21              Result: normal      She has not had a history of abnormal mammograms.    Last Colonoscopy: 18              Result: Polyp, repeat 5 years       Social History:  Lives with her  and son, feels safe at home.  Works as a  in [employer].  Enjoys spending time outside.  Does not have an advanced directive on file and would like her  and children to be her POA.  Would like full resuscitation if reversible cause is identified, however would not like to be kept on life sustaining measures long-term.     Family History:   The patient's family history is significant for.  Family History   Problem Relation Age of Onset     Cancer No family hx of          Physical Exam:   /82   Pulse 87   Temp 97.6  F (36.4  C) (Tympanic)   Ht 1.595 m (5' 2.8\")   Wt 66.6 kg (146 lb 12.8 oz)   SpO2 98%   BMI 26.17 kg/m     Abd:  soft, NT/ND, port sites without erythema/induration or drainage    Labs:  None       Assessment:    Atalelecremy Jerry is a 56 year old woman with a serous cystadenoma    A total of 15 minutes was spent on patient care today.      Plan:     1.)    Pelvic mass-.Pathology compatible with serous cystadenoma and this was reviewed with the patient.  Given the benign pathology she no longer needs follow up with the gynecologic oncology clinic.  She should continue to have routine yearly pelvic " exams and screening pap smears with her PCP.     2.) Genetic risk factors were assessed and the patient does not meet the qualifications for a referral.      3.) Labs and/or tests ordered include:  None     4.) Health maintenance issues addressed today include pt is up to date.        Thank you for allowing us to participate in the care of your patient.         Sincerely,    Pau Whitfield MD  Gynecologic Oncology  [facility]       CC             Again, thank you for allowing me to participate in the care of your patient.        Sincerely,        Kevin Whitfield MD

## 2021-12-28 NOTE — NURSING NOTE
"Oncology Rooming Note    December 28, 2021 11:30 AM   Nolan Edwards is a 56 year old female who presents for:    Chief Complaint   Patient presents with     Oncology Clinic Visit     Pelvic mass     Initial Vitals: /82   Pulse 87   Temp 97.6  F (36.4  C) (Tympanic)   Ht 1.595 m (5' 2.8\")   Wt 66.6 kg (146 lb 12.8 oz)   SpO2 98%   BMI 26.17 kg/m   Estimated body mass index is 26.17 kg/m  as calculated from the following:    Height as of this encounter: 1.595 m (5' 2.8\").    Weight as of this encounter: 66.6 kg (146 lb 12.8 oz). Body surface area is 1.72 meters squared.  No Pain (0) Comment: Data Unavailable   No LMP recorded. Patient is postmenopausal.  Allergies reviewed: Yes  Medications reviewed: Yes    Medications: Medication refills not needed today.  Pharmacy name entered into [facility]:    [id number] - [place], [place] - [address] [facility] [place] - [address]    Clinical concerns: follow up       Ade Hernandez CMA              "

## 2022-01-11 ENCOUNTER — PATIENT OUTREACH (OUTPATIENT)
Dept: ONCOLOGY | Facility: CLINIC | Age: 57
End: 2022-01-11
Payer: COMMERCIAL

## 2022-01-11 NOTE — LETTER
January 11, 2022    RE:  Nolan Edwards                              [address]  [place] [address]      To Whom It May Concern:    This letter is regarding my patient Ms. Souleymane Edwards who is under my care following surgery on December 8, 2021.  Ms. Edwards may return to work on Wednesday, January 19 with no restrictions.    Please let us know if you have further questions or concerns.      Sincerely,      Nahid Bell MD  [facility]   Department of Gynecologic Oncology

## 2022-01-11 NOTE — PROGRESS NOTES
Late entry from 1/10/2022:  Pt called and post surgery with Dr Whitfield on 12/8/2021, s/p laparoscopic BSO.  Pt states she will need a letter to return to work on 1/19/2022.  Pt informed that writer will send letter in the next few days through her my chart and she can submit.  Pt verbalized understanding of plan.    Do Diallo RN, BSN, OCN

## 2022-06-15 ENCOUNTER — PATIENT OUTREACH (OUTPATIENT)
Dept: ONCOLOGY | Facility: CLINIC | Age: 57
End: 2022-06-15
Payer: COMMERCIAL

## 2022-06-15 NOTE — PROGRESS NOTES
Received call from pt who states she had Laparoscopic BSO with Dr Whitfield on 12/8/2021, benign findings and discharged back to PCP.  Pt states prior to surgery, she was having abdominal pain right on mid to lower abdomen.  Seemed to get better but has returned over the months.  More recently pt has been having pain in the same area of abdomen after intercourse. Denies vaginal bleeding.Pt states she was treated in the last several weeks for UTI with course of antibiotics X2 and urinary symptoms resolved.  Pt saw PCP Dr Galindo and CT scan was ordered (in [place] from [facility]) and negative.      Pt denies fevers, eating and drinking well and normal BM's. Pt will take ibuprofen prn with relief for pain.  Pt frustrated with persistent pain and has follow up with PCP next week.  Pt would like Dr Whitfield's  Recommendations and if related to surgery..    Will notify Dr Whitfield and call pt back tomorrow with update and pt aware.    Do Diallo RN, BSN, OCN

## 2022-06-16 NOTE — PROGRESS NOTES
Pau Jaquez MD  You 22 hours ago (4:54 PM)     CR    Not likely related and she had benign disease so she should work with pcp to figure out other causes     Pt called and instructed as noted per MD.  Pt verbalized understanding and states she has appt with PCP next week.    Do Diallo RN, BSN, OCN

## 2022-08-03 ENCOUNTER — PATIENT OUTREACH (OUTPATIENT)
Dept: ONCOLOGY | Facility: CLINIC | Age: 57
End: 2022-08-03

## 2022-08-03 NOTE — PROGRESS NOTES
"Received call from pt who states she had surgery with Dr Whitfield on 12/8/2022, s/p Laparoscopic BSO which was benign. Pt has continued to complain of intermittent right sided pelvic pain that was present prior to surgery and then resolved for several months.  Pt called to clinic in June after PCP ordered CT scan, results with benign findings thus Dr Whitfield sent pt back to PCP to further evaluate to find other causes not related to surgery.      Pt called today in tears stating pt is still persisting and was sent to benign gyn but cannot be seen until 9/1.  Pt states the pain is there all the time and rates at 5/10 pain scale and 9-10 after intercourse.  Pt also reports a \"bulge\" in pelvic area that worsens after intercourse, no hernia seen on CT scan. This bulge will persist for 3-4 hours after intercourse. Pt will take ibuprofen or tylenol for pain but this will only help for 3 hours per pt.    Dr Whitfield notified and recommends pt see Viviane Liriano NP on 8/5 in the morning when Dr Whitfield is also in the clinic (but her schedule is full).  Pt called and provided with update and scheduled in person visit at 9:20 am.  Pt verbalized understanding of plan.    Viviane, please see pt call and will add her to your schedule as noted.    Do Diallo, RN, BSN, OCN    "

## 2022-08-04 NOTE — PROGRESS NOTES
"                Follow Up Notes on Referred Patient    Date: 2022       RE: Nolan Edwards  : 1965  VICTORIA: 2022           Nolan Edwards is a 56 year old woman with a serous cystadenoma. She is here today for an acute visit.     Clinical Course:     Patient had been having intermittent  right lower abdominal pain, however it worsened and she thus presented to the ED with an acute worsening of right lower quadrant pain and was found to have an ovarian mass. She denies changes in her bowel/bladder habits.  No PMB     3/18/08:  1.  Abdominal myomectomy with removal of thirteen fibroids. 2.  Bilateral tubal ligation via modified [place] with PA TATI GUERRERO MD                   Pathology:  Benign leiomyoma     21:   <5, CEA 0.8     21:  CT A/P :  1.  7.5 x 7.2 cm left adnexal mass indeterminate for malignancy. 2.  Diverticulosis without diverticulitis.     21:  US Pelvis:  UTERUS: 6.9 x 1.9 x 4.7 cm. Normal in size and position with no masses. ENDOMETRIUM: 3 mm. Not well-defined. No mass identified. RIGHT OVARY: Not visualized due to bowel gas. LEFT OVARY: 9.4 x 4.3 x 7.4 cm. The entire left adnexa is replaced with a multi-locular cystic mass with duplex Doppler flow noted within the septations.No significant free fluid.     21:                Laparoscopic bilateral salpingo-oophorectomy                 Pathology:  Serous cystadenoma    22: CT AP WO contrast IMPRESSION:  No renal, ureteral, or bladder calculi. No CT findings to explain the patient's symptoms. Mild colonic diverticulosis. No inflammatory changes or obstruction. Normal appendix          Obstetrics and Gynecology History:  , c/s x 2                Had pain at RLQ abdomen prior to surgery. After surgery, this pain restarted about 6 weeks after surgery. After interourse this pain worsens. Feel a \"buldge\" at RLQ abdomen prior to surgery now it is larger. Denies abdominal bloating. Reports that the pain " constant is a dull ache. Worsened over the last couple weeks. She used to take Iburopfen for this however now taking Tylenol for this and using heat and ice. This will help some per patient.  Taking Tylenol 500 mg twice per day. Saw PCP in June and had negative CT. Tried to see OBGYN from referral from PCP however per pt OBGYN office stated pt had to come back and see original surgeon. Reports colonoscopy is due in 2023. Reports last last colonoscopy normal. Regular bowel movements daily. No blood in stools. Reports UTI in June has resolved. She denies any vaginal bleeding, no changes in her bowel or bladder habits, no nausea/emesis, no lower extremity edema, and no difficulties eating or sleeping. She denies any abdominal bloating, no fevers or chills, and no chest pain or shortness of breath.                     Review of Systems:    Systemic           no weight changes; no fever; no chills; no night sweats; no appetite changes  Skin           no rashes, or lesions  Eye           no irritation; no changes in vision  Sugar-Laryngeal           no dysphagia; no hoarseness   Pulmonary    no cough; no shortness of breath  Cardiovascular    no chest pain; no palpitations  Gastrointestinal    no diarrhea; no constipation; + abdominal pain; no changes in bowel  habits; no blood in stool  Genitourinary   no urinary frequency; no urinary urgency; no dysuria; no pain; no abnormal vaginal discharge; no abnormal vaginal bleeding  Breast   no breast discharge; no breast changes; no breast pain  Musculoskeletal    no myalgias; no arthralgias; no back pain  Psychiatric           no depressed mood; no anxiety    Hematologic           no tender lymph nodes; no noticeable swellings or lumps   Endocrine    no hot flashes; no heat/cold intolerance         Neurological   no tremor; no numbness and tingling; no headaches; no difficulty  sleeping      Past Medical History:    Past Medical History:   Diagnosis Date     Hypothyroidism           Past Surgical History:    Past Surgical History:   Procedure Laterality Date      SECTION       LAPAROSCOPIC SALPINGO-OOPHORECTOMY, DISSECT LYMPH NODE Bilateral 2021    Procedure: laparoscopic removal of bilateral ovaries and fallopian tubes, pelvic washings, lysis of adhesions;  Surgeon: Pau Jaquez MD;  Location: [facility] OR     MYOMECTOMY       ROTATOR CUFF REPAIR RT/LT Right          Health Maintenance Due   Topic Date Due     PREVENTIVE CARE VISIT  Never done     ADVANCE CARE PLANNING  Never done     COLORECTAL CANCER SCREENING  Never done     HIV SCREENING  Never done     HEPATITIS C SCREENING  Never done     PAP  Never done     LIPID  Never done     ZOSTER IMMUNIZATION (1 of 2) Never done     PHQ-2 (once per calendar year)  Never done     COVID-19 Vaccine (4 - Booster for Pfizer series) 2022       Current Medications:     Current Outpatient Medications   Medication Sig Dispense Refill     acetaminophen (TYLENOL) 325 MG tablet Take 1-2 tablets (325-650 mg) by mouth every 6 hours as needed for mild pain 100 tablet 0     cholecalciferol ( ULTRA STRENGTH) 50 MCG (2000 UT) CAPS Take 2,000 Units by mouth       ibuprofen (ADVIL/MOTRIN) 200 MG capsule Take 200 mg by mouth every 4 hours as needed for fever       levothyroxine (SYNTHROID/LEVOTHROID) 75 MCG tablet Take 75 mcg by mouth daily           Allergies:        Allergies   Allergen Reactions     Ceftriaxone      Other reaction(s): Edema     Cephalexin Hives     Other reaction(s): Edema     Wkhurifj-Uzfxtyj-Qvoxol [Fluocinolone]      Quinolones Other (See Comments)     Per pt, not completely sure on cipro allergy  Per pt, not completely sure on cipro allergy       Salicylates Other (See Comments)     Other reaction(s): GI Upset  Not documented          Social History:     Social History     Tobacco Use     Smoking status: Never Smoker     Smokeless tobacco: Never Used   Substance Use Topics     Alcohol use: Never        History   Drug Use Unknown         Family History:     The patient's family history is notable for .    Family History   Problem Relation Age of Onset     Cancer No family hx of          Physical Exam:     BP (!) 144/84 (BP Location: Left arm, Patient Position: Sitting, Cuff Size: Adult Regular)   Pulse 72   Temp 98.7  F (37.1  C) (Oral)   Resp 18   Wt 69.2 kg (152 lb 9.6 oz)   SpO2 98%   BMI 27.20 kg/m    Body mass index is 27.2 kg/m .    General Appearance: healthy and alert, no distress     HEENT: no thyromegaly, no palpable nodules or masses        Cardiovascular: regular rate and rhythm, no gallops, rubs or murmurs     Respiratory: lungs clear, no rales, rhonchi or wheezes    Musculoskeletal: extremities non tender and without edema    Skin: no lesions or rashes     Neurological: normal gait, no gross defects     Psychiatric: appropriate mood and affect                               Hematological: normal cervical, supraclavicular and inguinal lymph nodes     Gastrointestinal:       abdomen soft, tender at RLQ abdomen with palpation, non-distended, no organomegaly or masses    Genitourinary: External genitalia and urethral meatus appears normal.  Vagina is smooth without nodularity or masses.  Cervix appears normal and without lesions.  Bimanual exam reveal no masses, nodularity or fullness.  Recto-vaginal exam confirms these findings.      Assessment:      Nolan Edwards is a 56 year old woman with a serous cystadenoma. She is here today for an acute visit.     25 minutes spent on the date of the encounter doing chart review, history and exam, documentation, and further activities as noted above.        Plan:     1.)        Pelvic pain: discussed plan with Dr. Whitfield in clinic. Exam unremarkable. Refer to GI for a workup as pain is not related to gyn surgery. Colonoscopy due 2023 per pt. Denies issues with bowel movements. Had mild diverticulitis on CT in June 2022. Reviewed APAP and Ibuprofen safe  dosages and heat/ice, epsom salt baths, avoiding heavy lifting etc. Given the benign pathology she no longer needs follow up with the gynecologic oncology clinic.  She should continue to have routine yearly pelvic exams and screening pap smears with her PCP.     2.) Genetic risk factors were assessed and the patient does not meet the qualifications for a referral.      3.) Labs and/or tests ordered include:  none.     4.) Health maintenance issues addressed today include annual health maintenance and non-gynecologic issues with PCP.    5.)       Referral: COURTNEY Ochoa, NP-BC  Women's Health Nurse Practitioner  Division of Gynecologic Oncology  [facility]        CC  Patient Care Team:  Lynsey Galindo MD as PCP - General  Pau Jaquez MD as Assigned Cancer Care Provider  Do Diallo RN as Specialty Care Coordinator (Oncology)

## 2022-08-05 ENCOUNTER — ONCOLOGY VISIT (OUTPATIENT)
Dept: ONCOLOGY | Facility: CLINIC | Age: 57
End: 2022-08-05
Attending: OBSTETRICS & GYNECOLOGY
Payer: COMMERCIAL

## 2022-08-05 VITALS
RESPIRATION RATE: 18 BRPM | SYSTOLIC BLOOD PRESSURE: 144 MMHG | HEART RATE: 72 BPM | WEIGHT: 152.6 LBS | BODY MASS INDEX: 27.2 KG/M2 | DIASTOLIC BLOOD PRESSURE: 84 MMHG | TEMPERATURE: 98.7 F | OXYGEN SATURATION: 98 %

## 2022-08-05 DIAGNOSIS — R10.2 PELVIC PAIN IN FEMALE: Primary | ICD-10-CM

## 2022-08-05 DIAGNOSIS — R10.31 RLQ ABDOMINAL PAIN: ICD-10-CM

## 2022-08-05 PROCEDURE — 99213 OFFICE O/P EST LOW 20 MIN: CPT | Performed by: OBSTETRICS & GYNECOLOGY

## 2022-08-05 PROCEDURE — G0463 HOSPITAL OUTPT CLINIC VISIT: HCPCS

## 2022-08-05 ASSESSMENT — PAIN SCALES - GENERAL: PAINLEVEL: SEVERE PAIN (7)

## 2022-08-05 NOTE — PROGRESS NOTES
"Oncology Rooming Note    August 5, 2022 9:17 AM   Nolan Edwards is a 56 year old female who presents for:    Chief Complaint   Patient presents with     Oncology Clinic Visit     Initial Vitals: BP (!) 144/84 (BP Location: Left arm, Patient Position: Sitting, Cuff Size: Adult Regular)   Pulse 72   Temp 98.7  F (37.1  C) (Oral)   Resp 18   Wt 69.2 kg (152 lb 9.6 oz)   SpO2 98%   BMI 27.20 kg/m   Estimated body mass index is 27.2 kg/m  as calculated from the following:    Height as of 12/28/21: 1.595 m (5' 2.8\").    Weight as of this encounter: 69.2 kg (152 lb 9.6 oz). Body surface area is 1.75 meters squared.  Severe Pain (7) Comment: Data Unavailable   No LMP recorded. Patient is postmenopausal.  Allergies reviewed: Yes  Medications reviewed: Yes    Medications: Medication refills not needed today.  Pharmacy name entered into [facility]:    [id number] - [place], [place] - [address] [facility] [address]  [place] - [address] [address]    Clinical concerns: no        Dolores Garcia CMA              "

## 2022-08-05 NOTE — LETTER
2022         RE: Nolan Edwards  [address]  [place] [address]        Dear Colleague,    Thank you for referring your patient, Nolan Edwards, to the [facility] [place]. Please see a copy of my visit note below.                    Follow Up Notes on Referred Patient    Date: 2022       RE: Nolan Edwards  : 1965  VICTORIA: 2022           Nolan Edwards is a 56 year old woman with a serous cystadenoma. She is here today for an acute visit.     Clinical Course:     Patient had been having intermittent  right lower abdominal pain, however it worsened and she thus presented to the ED with an acute worsening of right lower quadrant pain and was found to have an ovarian mass. She denies changes in her bowel/bladder habits.  No PMB     3/18/08:  1.  Abdominal myomectomy with removal of thirteen fibroids. 2.  Bilateral tubal ligation via modified [place] with PA TATI GUERRERO MD                   Pathology:  Benign leiomyoma     21:   <5, CEA 0.8     21:  CT A/P :  1.  7.5 x 7.2 cm left adnexal mass indeterminate for malignancy. 2.  Diverticulosis without diverticulitis.     21:  US Pelvis:  UTERUS: 6.9 x 1.9 x 4.7 cm. Normal in size and position with no masses. ENDOMETRIUM: 3 mm. Not well-defined. No mass identified. RIGHT OVARY: Not visualized due to bowel gas. LEFT OVARY: 9.4 x 4.3 x 7.4 cm. The entire left adnexa is replaced with a multi-locular cystic mass with duplex Doppler flow noted within the septations.No significant free fluid.     21:                Laparoscopic bilateral salpingo-oophorectomy                 Pathology:  Serous cystadenoma    22: CT AP WO contrast IMPRESSION:  No renal, ureteral, or bladder calculi. No CT findings to explain the patient's symptoms. Mild colonic diverticulosis. No inflammatory changes or obstruction. Normal appendix          Obstetrics and Gynecology History:  , c/s x 2                Had pain  "at RLQ abdomen prior to surgery. After surgery, this pain restarted about 6 weeks after surgery. After interourse this pain worsens. Feel a \"buldge\" at RLQ abdomen prior to surgery now it is larger. Denies abdominal bloating. Reports that the pain constant is a dull ache. Worsened over the last couple weeks. She used to take Iburopfen for this however now taking Tylenol for this and using heat and ice. This will help some per patient.  Taking Tylenol 500 mg twice per day. Saw PCP in June and had negative CT. Tried to see OBGYN from referral from PCP however per pt OBGYN office stated pt had to come back and see original surgeon. Reports colonoscopy is due in 2023. Reports last last colonoscopy normal. Regular bowel movements daily. No blood in stools. Reports UTI in June has resolved. She denies any vaginal bleeding, no changes in her bowel or bladder habits, no nausea/emesis, no lower extremity edema, and no difficulties eating or sleeping. She denies any abdominal bloating, no fevers or chills, and no chest pain or shortness of breath.                     Review of Systems:    Systemic           no weight changes; no fever; no chills; no night sweats; no appetite changes  Skin           no rashes, or lesions  Eye           no irritation; no changes in vision  Sugar-Laryngeal           no dysphagia; no hoarseness   Pulmonary    no cough; no shortness of breath  Cardiovascular    no chest pain; no palpitations  Gastrointestinal    no diarrhea; no constipation; + abdominal pain; no changes in bowel  habits; no blood in stool  Genitourinary   no urinary frequency; no urinary urgency; no dysuria; no pain; no abnormal vaginal discharge; no abnormal vaginal bleeding  Breast   no breast discharge; no breast changes; no breast pain  Musculoskeletal    no myalgias; no arthralgias; no back pain  Psychiatric           no depressed mood; no anxiety    Hematologic           no tender lymph nodes; no noticeable swellings or lumps "   Endocrine    no hot flashes; no heat/cold intolerance         Neurological   no tremor; no numbness and tingling; no headaches; no difficulty  sleeping      Past Medical History:    Past Medical History:   Diagnosis Date     Hypothyroidism          Past Surgical History:    Past Surgical History:   Procedure Laterality Date      SECTION       LAPAROSCOPIC SALPINGO-OOPHORECTOMY, DISSECT LYMPH NODE Bilateral 2021    Procedure: laparoscopic removal of bilateral ovaries and fallopian tubes, pelvic washings, lysis of adhesions;  Surgeon: Pau Jaquez MD;  Location: [facility]     MYOMECTOMY       ROTATOR CUFF REPAIR RT/LT Right          Health Maintenance Due   Topic Date Due     PREVENTIVE CARE VISIT  Never done     ADVANCE CARE PLANNING  Never done     COLORECTAL CANCER SCREENING  Never done     HIV SCREENING  Never done     HEPATITIS C SCREENING  Never done     PAP  Never done     LIPID  Never done     ZOSTER IMMUNIZATION (1 of 2) Never done     PHQ-2 (once per calendar year)  Never done     COVID-19 Vaccine (4 - Booster for Pfizer series) 2022       Current Medications:     Current Outpatient Medications   Medication Sig Dispense Refill     acetaminophen (TYLENOL) 325 MG tablet Take 1-2 tablets (325-650 mg) by mouth every 6 hours as needed for mild pain 100 tablet 0     cholecalciferol ( ULTRA STRENGTH) 50 MCG (2000 UT) CAPS Take 2,000 Units by mouth       ibuprofen (ADVIL/MOTRIN) 200 MG capsule Take 200 mg by mouth every 4 hours as needed for fever       levothyroxine (SYNTHROID/LEVOTHROID) 75 MCG tablet Take 75 mcg by mouth daily           Allergies:        Allergies   Allergen Reactions     Ceftriaxone      Other reaction(s): Edema     Cephalexin Hives     Other reaction(s): Edema     Bwygzcoi-Lenhymi-Xwyxuh [Fluocinolone]      Quinolones Other (See Comments)     Per pt, not completely sure on cipro allergy  Per pt, not completely sure on cipro allergy       Salicylates Other (See  Comments)     Other reaction(s): GI Upset  Not documented          Social History:     Social History     Tobacco Use     Smoking status: Never Smoker     Smokeless tobacco: Never Used   Substance Use Topics     Alcohol use: Never       History   Drug Use Unknown         Family History:     The patient's family history is notable for .    Family History   Problem Relation Age of Onset     Cancer No family hx of          Physical Exam:     BP (!) 144/84 (BP Location: Left arm, Patient Position: Sitting, Cuff Size: Adult Regular)   Pulse 72   Temp 98.7  F (37.1  C) (Oral)   Resp 18   Wt 69.2 kg (152 lb 9.6 oz)   SpO2 98%   BMI 27.20 kg/m    Body mass index is 27.2 kg/m .    General Appearance: healthy and alert, no distress     HEENT: no thyromegaly, no palpable nodules or masses        Cardiovascular: regular rate and rhythm, no gallops, rubs or murmurs     Respiratory: lungs clear, no rales, rhonchi or wheezes    Musculoskeletal: extremities non tender and without edema    Skin: no lesions or rashes     Neurological: normal gait, no gross defects     Psychiatric: appropriate mood and affect                               Hematological: normal cervical, supraclavicular and inguinal lymph nodes     Gastrointestinal:       abdomen soft, tender at RLQ abdomen with palpation, non-distended, no organomegaly or masses    Genitourinary: External genitalia and urethral meatus appears normal.  Vagina is smooth without nodularity or masses.  Cervix appears normal and without lesions.  Bimanual exam reveal no masses, nodularity or fullness.  Recto-vaginal exam confirms these findings.      Assessment:      Nolan Edwards is a 56 year old woman with a serous cystadenoma. She is here today for an acute visit.     25 minutes spent on the date of the encounter doing chart review, history and exam, documentation, and further activities as noted above.        Plan:     1.)        Pelvic pain: discussed plan with Dr. Whitfield in  "clinic. Exam unremarkable. Refer to GI for a workup as pain is not related to gyn surgery. Colonoscopy due 2023 per pt. Denies issues with bowel movements. Had mild diverticulitis on CT in June 2022. Reviewed APAP and Ibuprofen safe dosages and heat/ice, epsom salt baths, avoiding heavy lifting etc. Given the benign pathology she no longer needs follow up with the gynecologic oncology clinic.  She should continue to have routine yearly pelvic exams and screening pap smears with her PCP.     2.) Genetic risk factors were assessed and the patient does not meet the qualifications for a referral.      3.) Labs and/or tests ordered include:  none.     4.) Health maintenance issues addressed today include annual health maintenance and non-gynecologic issues with PCP.    5.)       Referral: COURTNEY Ochoa, NP-BC  Women's Health Nurse Practitioner  Division of Gynecologic Oncology  [facility]        CC  Patient Care Team:  Lynsey Galindo MD as PCP - General  Pau Jaquez MD as Assigned Cancer Care Provider  Do Diallo RN as Specialty Care Coordinator (Oncology)           Oncology Rooming Note    August 5, 2022 9:17 AM   Nolan Edwards is a 56 year old female who presents for:    Chief Complaint   Patient presents with     Oncology Clinic Visit     Initial Vitals: BP (!) 144/84 (BP Location: Left arm, Patient Position: Sitting, Cuff Size: Adult Regular)   Pulse 72   Temp 98.7  F (37.1  C) (Oral)   Resp 18   Wt 69.2 kg (152 lb 9.6 oz)   SpO2 98%   BMI 27.20 kg/m   Estimated body mass index is 27.2 kg/m  as calculated from the following:    Height as of 12/28/21: 1.595 m (5' 2.8\").    Weight as of this encounter: 69.2 kg (152 lb 9.6 oz). Body surface area is 1.75 meters squared.  Severe Pain (7) Comment: Data Unavailable   No LMP recorded. Patient is postmenopausal.  Allergies reviewed: Yes  Medications reviewed: Yes    Medications: Medication refills " not needed today.  Pharmacy name entered into [facility]:    [id number] - [place], [place] - [address] [facility] - [place], [place] - [address] [place] [address]    Clinical concerns: no        Dolores Garcia [facility]                  Again, thank you for allowing me to participate in the care of your patient.        Sincerely,        COURTNEY Laws CNP

## 2022-08-05 NOTE — PATIENT INSTRUCTIONS
Tylenol 3000 mg per 24 hours      OTC labeling (patient-guided therapy): Oral: 200 mg every 4 to 6 hours as needed; if no relief, may increase to 400 mg every 4 to 6 hours as needed; maximum dose: 1.2 g/day. Use for >10 days is not recommended unless directed by health care provider.       Rotate heat and ice       Follow up with GI referral

## 2022-10-10 ENCOUNTER — HEALTH MAINTENANCE LETTER (OUTPATIENT)
Age: 57
End: 2022-10-10

## 2022-11-27 ENCOUNTER — HEALTH MAINTENANCE LETTER (OUTPATIENT)
Age: 57
End: 2022-11-27

## 2023-10-28 ENCOUNTER — HEALTH MAINTENANCE LETTER (OUTPATIENT)
Age: 58
End: 2023-10-28

## 2024-01-06 ENCOUNTER — HEALTH MAINTENANCE LETTER (OUTPATIENT)
Age: 59
End: 2024-01-06

## 2025-01-25 ENCOUNTER — HEALTH MAINTENANCE LETTER (OUTPATIENT)
Age: 60
End: 2025-01-25

## 2025-07-07 ENCOUNTER — LAB REQUISITION (OUTPATIENT)
Dept: LAB | Facility: CLINIC | Age: 60
End: 2025-07-07
Payer: COMMERCIAL

## 2025-07-07 DIAGNOSIS — R22.31 LOCALIZED SWELLING, MASS AND LUMP, RIGHT UPPER LIMB: ICD-10-CM

## 2025-07-07 PROCEDURE — 88305 TISSUE EXAM BY PATHOLOGIST: CPT | Mod: TC,ORL | Performed by: ORTHOPAEDIC SURGERY

## 2025-07-16 LAB
PATH REPORT.COMMENTS IMP SPEC: NORMAL
PATH REPORT.COMMENTS IMP SPEC: NORMAL
PATH REPORT.FINAL DX SPEC: NORMAL
PATH REPORT.GROSS SPEC: NORMAL
PATH REPORT.MICROSCOPIC SPEC OTHER STN: NORMAL
PATH REPORT.RELEVANT HX SPEC: NORMAL
PHOTO IMAGE: NORMAL

## 2025-07-16 PROCEDURE — 88304 TISSUE EXAM BY PATHOLOGIST: CPT | Mod: 26 | Performed by: PATHOLOGY

## (undated) DEVICE — SOL NACL 0.9% INJ 1000ML BAG 2B1324X

## (undated) DEVICE — ENDO TROCAR FIRST ENTRY KII FIOS Z-THRD 12X100MM CTF73

## (undated) DEVICE — NDL INSUFFLATION 13GA 120MM C2201

## (undated) DEVICE — ENDO SCOPE WARMER LF TM500

## (undated) DEVICE — ENDO TROCAR FIRST ENTRY KII FIOS Z-THRD 05X100MM CTF03

## (undated) DEVICE — DRAPE LAVH/LAPAROSCOPY W/POUCH 29474

## (undated) DEVICE — CATH TRAY FOLEY SURESTEP 16FR WDRAIN BAG STLK LATEX A300316A

## (undated) DEVICE — SU VICRYL 4-0 PS-2 18" UND J496H

## (undated) DEVICE — WIPES FOLEY CARE SURESTEP PROVON DFC100

## (undated) DEVICE — ESU CORD MONOPOLAR 10'  E0510

## (undated) DEVICE — DEVICE SUTURE GRASPER TROCAR CLOSURE 14GA PMITCSG

## (undated) DEVICE — ADH SKIN CLOSURE PREMIERPRO EXOFIN 1.0ML 3470

## (undated) DEVICE — ESU GROUND PAD UNIVERSAL W/O CORD

## (undated) DEVICE — ENDO POUCH UNIV RETRIEVAL SYSTEM INZII 10MM CD001

## (undated) DEVICE — DRAPE LEGGINGS 8421

## (undated) DEVICE — ESU LIGASURE SEALER/DIVIDER RETRACT L-HOOK 37CM LF5637

## (undated) DEVICE — SURGICEL HEMOSTAT 2X14" 1951

## (undated) DEVICE — LINEN TOWEL PACK X5 5464

## (undated) DEVICE — GLOVE PROTEXIS BLUE W/NEU-THERA 7.0  2D73EB70

## (undated) DEVICE — ESU ENDO SCISSORS 5MM CVD 5DCS

## (undated) DEVICE — Device

## (undated) DEVICE — GLOVE PROTEXIS POWDER FREE SMT 6.5  2D72PT65X

## (undated) DEVICE — KIT PATIENT POSITIONING PIGAZZI LATEX FREE 40580

## (undated) DEVICE — SU VICRYL 0 UR-6 27" J603H

## (undated) RX ORDER — LIDOCAINE HYDROCHLORIDE 20 MG/ML
INJECTION, SOLUTION EPIDURAL; INFILTRATION; INTRACAUDAL; PERINEURAL
Status: DISPENSED
Start: 2021-12-08

## (undated) RX ORDER — ONDANSETRON 2 MG/ML
INJECTION INTRAMUSCULAR; INTRAVENOUS
Status: DISPENSED
Start: 2021-12-08

## (undated) RX ORDER — HEPARIN SODIUM 5000 [USP'U]/.5ML
INJECTION, SOLUTION INTRAVENOUS; SUBCUTANEOUS
Status: DISPENSED
Start: 2021-12-08

## (undated) RX ORDER — GLYCOPYRROLATE 0.2 MG/ML
INJECTION, SOLUTION INTRAMUSCULAR; INTRAVENOUS
Status: DISPENSED
Start: 2021-12-08

## (undated) RX ORDER — PHENAZOPYRIDINE HYDROCHLORIDE 200 MG/1
TABLET, FILM COATED ORAL
Status: DISPENSED
Start: 2021-12-08

## (undated) RX ORDER — FENTANYL CITRATE 0.05 MG/ML
INJECTION, SOLUTION INTRAMUSCULAR; INTRAVENOUS
Status: DISPENSED
Start: 2021-12-08

## (undated) RX ORDER — FENTANYL CITRATE 50 UG/ML
INJECTION, SOLUTION INTRAMUSCULAR; INTRAVENOUS
Status: DISPENSED
Start: 2021-12-08

## (undated) RX ORDER — NEOSTIGMINE METHYLSULFATE 1 MG/ML
VIAL (ML) INJECTION
Status: DISPENSED
Start: 2021-12-08

## (undated) RX ORDER — OXYCODONE HYDROCHLORIDE 5 MG/1
TABLET ORAL
Status: DISPENSED
Start: 2021-12-08

## (undated) RX ORDER — PROPOFOL 10 MG/ML
INJECTION, EMULSION INTRAVENOUS
Status: DISPENSED
Start: 2021-12-08

## (undated) RX ORDER — HYDROMORPHONE HCL IN WATER/PF 6 MG/30 ML
PATIENT CONTROLLED ANALGESIA SYRINGE INTRAVENOUS
Status: DISPENSED
Start: 2021-12-08

## (undated) RX ORDER — ACETAMINOPHEN 325 MG/1
TABLET ORAL
Status: DISPENSED
Start: 2021-12-08